# Patient Record
Sex: MALE | Race: WHITE | NOT HISPANIC OR LATINO | Employment: OTHER | URBAN - METROPOLITAN AREA
[De-identification: names, ages, dates, MRNs, and addresses within clinical notes are randomized per-mention and may not be internally consistent; named-entity substitution may affect disease eponyms.]

---

## 2023-04-14 PROBLEM — F41.9 ANXIETY DISORDER: Status: ACTIVE | Noted: 2023-04-14

## 2023-04-14 PROBLEM — N18.9 CKD (CHRONIC KIDNEY DISEASE): Status: ACTIVE | Noted: 2023-04-14

## 2023-04-14 PROBLEM — J01.10 ACUTE FRONTAL SINUSITIS: Status: ACTIVE | Noted: 2023-04-14

## 2023-04-14 PROBLEM — E78.5 HYPERLIPIDEMIA: Status: ACTIVE | Noted: 2023-04-14

## 2023-04-14 PROBLEM — N40.0 BPH (BENIGN PROSTATIC HYPERPLASIA): Status: ACTIVE | Noted: 2023-04-14

## 2023-04-14 PROBLEM — M25.519 SHOULDER PAIN: Status: ACTIVE | Noted: 2023-04-14

## 2023-04-14 PROBLEM — M25.552 LEFT HIP PAIN: Status: ACTIVE | Noted: 2023-04-14

## 2023-04-14 PROBLEM — E55.9 VITAMIN D DEFICIENCY: Status: ACTIVE | Noted: 2023-04-14

## 2023-04-14 PROBLEM — K57.32 DIVERTICULITIS, COLON: Status: ACTIVE | Noted: 2023-04-14

## 2023-04-14 PROBLEM — J30.2 SEASONAL ALLERGIES: Status: ACTIVE | Noted: 2023-04-14

## 2023-04-14 PROBLEM — J20.9 ACUTE BRONCHITIS: Status: ACTIVE | Noted: 2023-04-14

## 2023-04-14 PROBLEM — I48.91 ATRIAL FIBRILLATION (MULTI): Status: ACTIVE | Noted: 2023-04-14

## 2023-04-14 PROBLEM — R31.9 HEMATURIA: Status: ACTIVE | Noted: 2023-04-14

## 2023-04-14 PROBLEM — R51.9 HEADACHE: Status: ACTIVE | Noted: 2023-04-14

## 2023-04-14 PROBLEM — N20.0 RIGHT NEPHROLITHIASIS: Status: ACTIVE | Noted: 2023-04-14

## 2023-04-14 PROBLEM — H10.30 ACUTE CONJUNCTIVITIS: Status: ACTIVE | Noted: 2023-04-14

## 2023-04-14 PROBLEM — H92.09 EAR ACHE: Status: ACTIVE | Noted: 2023-04-14

## 2023-04-14 PROBLEM — J11.1 INFLUENZA: Status: ACTIVE | Noted: 2023-04-14

## 2023-04-14 PROBLEM — H02.009 ENTROPION: Status: ACTIVE | Noted: 2023-04-14

## 2023-04-14 PROBLEM — I10 HYPERTENSION: Status: ACTIVE | Noted: 2023-04-14

## 2023-04-14 PROBLEM — H93.8X1 SENSATION OF PLUGGED EAR ON RIGHT SIDE: Status: ACTIVE | Noted: 2023-04-14

## 2023-04-14 PROBLEM — J02.9 SORE THROAT: Status: ACTIVE | Noted: 2023-04-14

## 2023-04-14 PROBLEM — B35.1 ONYCHOMYCOSIS: Status: ACTIVE | Noted: 2023-04-14

## 2023-04-14 PROBLEM — N52.9 INABILITY TO ATTAIN ERECTION: Status: ACTIVE | Noted: 2023-04-14

## 2023-04-14 PROBLEM — H91.91 DECREASED HEARING OF RIGHT EAR: Status: ACTIVE | Noted: 2023-04-14

## 2023-04-14 PROBLEM — R10.9 ABDOMINAL PAIN: Status: ACTIVE | Noted: 2023-04-14

## 2023-04-14 PROBLEM — N52.9 ERECTILE DYSFUNCTION: Status: ACTIVE | Noted: 2023-04-14

## 2023-04-14 PROBLEM — T14.8XXA MUSCLE STRAIN: Status: ACTIVE | Noted: 2023-04-14

## 2023-04-14 PROBLEM — L03.90 CELLULITIS: Status: ACTIVE | Noted: 2023-04-14

## 2023-04-14 PROBLEM — R07.9 CHEST PAIN: Status: ACTIVE | Noted: 2023-04-14

## 2023-04-14 PROBLEM — M19.90 OSTEOARTHROSIS: Status: ACTIVE | Noted: 2023-04-14

## 2023-04-14 PROBLEM — H61.20 IMPACTED CERUMEN: Status: ACTIVE | Noted: 2023-04-14

## 2023-04-14 PROBLEM — I10 BENIGN ESSENTIAL HYPERTENSION: Status: ACTIVE | Noted: 2023-04-14

## 2023-04-14 PROBLEM — M54.50 LOW BACK PAIN: Status: ACTIVE | Noted: 2023-04-14

## 2023-04-14 PROBLEM — I25.10 CORONARY ATHEROSCLEROSIS OF NATIVE CORONARY VESSEL: Status: ACTIVE | Noted: 2023-04-14

## 2023-04-14 PROBLEM — R35.1 NOCTURIA: Status: ACTIVE | Noted: 2023-04-14

## 2023-04-14 PROBLEM — R42 DIZZINESS: Status: ACTIVE | Noted: 2023-04-14

## 2023-05-23 ENCOUNTER — APPOINTMENT (OUTPATIENT)
Dept: PRIMARY CARE | Facility: CLINIC | Age: 73
End: 2023-05-23
Payer: MEDICARE

## 2023-06-26 DIAGNOSIS — F41.9 ANXIETY DISORDER, UNSPECIFIED TYPE: ICD-10-CM

## 2023-06-26 DIAGNOSIS — N20.0 RIGHT NEPHROLITHIASIS: ICD-10-CM

## 2023-06-26 RX ORDER — BUSPIRONE HYDROCHLORIDE 30 MG/1
30 TABLET ORAL 2 TIMES DAILY
Qty: 180 TABLET | Refills: 3 | Status: SHIPPED | OUTPATIENT
Start: 2023-06-26 | End: 2024-04-09 | Stop reason: SDUPTHER

## 2023-06-26 RX ORDER — OMEPRAZOLE 40 MG/1
1 CAPSULE, DELAYED RELEASE ORAL DAILY
COMMUNITY
Start: 2021-06-07 | End: 2023-07-25 | Stop reason: SDUPTHER

## 2023-06-26 RX ORDER — TAMSULOSIN HYDROCHLORIDE 0.4 MG/1
0.4 CAPSULE ORAL 2 TIMES DAILY
COMMUNITY
Start: 2023-05-28 | End: 2023-06-26 | Stop reason: SDUPTHER

## 2023-06-26 RX ORDER — FISH OIL/DHA/EPA 1200-144MG
CAPSULE ORAL
COMMUNITY
Start: 2008-02-21

## 2023-06-26 RX ORDER — METOPROLOL SUCCINATE 200 MG/1
200 TABLET, EXTENDED RELEASE ORAL DAILY
COMMUNITY
End: 2023-07-25 | Stop reason: SDUPTHER

## 2023-06-26 RX ORDER — FUROSEMIDE 20 MG/1
20 TABLET ORAL
COMMUNITY
Start: 2023-06-08 | End: 2023-07-25 | Stop reason: SDUPTHER

## 2023-06-26 RX ORDER — FENOFIBRATE 160 MG/1
160 TABLET ORAL
COMMUNITY
Start: 2019-08-13 | End: 2023-07-25 | Stop reason: SDUPTHER

## 2023-06-26 RX ORDER — GABAPENTIN 300 MG/1
300 CAPSULE ORAL 3 TIMES DAILY
COMMUNITY
End: 2024-04-22 | Stop reason: SDUPTHER

## 2023-06-26 RX ORDER — GLUCOSAMINE/MSM/CHONDROIT SULF 500-166.6
TABLET ORAL
COMMUNITY
Start: 2021-11-18 | End: 2023-07-27 | Stop reason: ALTCHOICE

## 2023-06-26 RX ORDER — TADALAFIL 20 MG/1
TABLET ORAL
COMMUNITY
Start: 2013-07-26

## 2023-06-26 RX ORDER — ERGOCALCIFEROL 1.25 MG/1
1 CAPSULE ORAL WEEKLY
COMMUNITY
Start: 2018-04-19

## 2023-06-26 RX ORDER — BUSPIRONE HYDROCHLORIDE 30 MG/1
1 TABLET ORAL 2 TIMES DAILY
COMMUNITY
Start: 2019-08-20 | End: 2023-07-21 | Stop reason: SDUPTHER

## 2023-06-26 RX ORDER — TAMSULOSIN HYDROCHLORIDE 0.4 MG/1
0.4 CAPSULE ORAL 2 TIMES DAILY
Qty: 180 CAPSULE | Refills: 3 | Status: SHIPPED | OUTPATIENT
Start: 2023-06-26 | End: 2024-04-09 | Stop reason: SDUPTHER

## 2023-07-21 PROBLEM — H61.20 IMPACTED CERUMEN: Status: RESOLVED | Noted: 2023-04-14 | Resolved: 2023-07-21

## 2023-07-21 PROBLEM — J02.9 SORE THROAT: Status: RESOLVED | Noted: 2023-04-14 | Resolved: 2023-07-21

## 2023-07-21 PROBLEM — J20.9 ACUTE BRONCHITIS: Status: RESOLVED | Noted: 2023-04-14 | Resolved: 2023-07-21

## 2023-07-21 PROBLEM — J01.10 ACUTE FRONTAL SINUSITIS: Status: RESOLVED | Noted: 2023-04-14 | Resolved: 2023-07-21

## 2023-07-21 PROBLEM — R51.9 NEW ONSET OF HEADACHES: Status: ACTIVE | Noted: 2023-07-21

## 2023-07-21 PROBLEM — M25.522 ARTHRALGIA OF BOTH ELBOWS: Status: ACTIVE | Noted: 2023-07-21

## 2023-07-21 PROBLEM — E66.9 OBESITY (BMI 35.0-39.9 WITHOUT COMORBIDITY): Status: ACTIVE | Noted: 2020-07-20

## 2023-07-21 PROBLEM — R01.1 SYSTOLIC EJECTION MURMUR: Status: ACTIVE | Noted: 2017-06-11

## 2023-07-21 PROBLEM — L98.9 LESION OF SKIN OF NOSE: Status: ACTIVE | Noted: 2023-07-21

## 2023-07-21 PROBLEM — J34.2 DEVIATED NASAL SEPTUM: Status: ACTIVE | Noted: 2020-07-20

## 2023-07-21 PROBLEM — H90.A22 SENSORINEURAL HEARING LOSS, UNILATERAL, LEFT EAR, WITH RESTRICTED HEARING ON THE CONTRALATERAL SIDE: Status: ACTIVE | Noted: 2018-09-06

## 2023-07-21 PROBLEM — R51.9 HEADACHE: Status: RESOLVED | Noted: 2023-04-14 | Resolved: 2023-07-21

## 2023-07-21 PROBLEM — H10.30 ACUTE CONJUNCTIVITIS: Status: RESOLVED | Noted: 2023-04-14 | Resolved: 2023-07-21

## 2023-07-21 PROBLEM — M25.521 ARTHRALGIA OF BOTH ELBOWS: Status: ACTIVE | Noted: 2023-07-21

## 2023-07-21 PROBLEM — H92.09 EAR ACHE: Status: RESOLVED | Noted: 2023-04-14 | Resolved: 2023-07-21

## 2023-07-21 PROBLEM — I10 HYPERTENSION: Status: RESOLVED | Noted: 2023-04-14 | Resolved: 2023-07-21

## 2023-07-21 PROBLEM — J11.1 INFLUENZA: Status: RESOLVED | Noted: 2023-04-14 | Resolved: 2023-07-21

## 2023-07-21 PROBLEM — I87.2 VENOUS INSUFFICIENCY OF BOTH LOWER EXTREMITIES: Status: ACTIVE | Noted: 2023-07-21

## 2023-07-21 PROBLEM — N21.0 BLADDER STONE: Status: ACTIVE | Noted: 2023-07-21

## 2023-07-21 RX ORDER — FINASTERIDE 5 MG/1
TABLET, FILM COATED ORAL
COMMUNITY
Start: 2023-05-30 | End: 2024-04-09 | Stop reason: SDUPTHER

## 2023-07-21 RX ORDER — MELOXICAM 15 MG/1
TABLET ORAL
COMMUNITY
Start: 2023-01-16 | End: 2023-07-27 | Stop reason: ALTCHOICE

## 2023-07-25 ENCOUNTER — CLINICAL SUPPORT (OUTPATIENT)
Dept: PRIMARY CARE | Facility: CLINIC | Age: 73
End: 2023-07-25
Payer: MEDICARE

## 2023-07-25 DIAGNOSIS — E03.9 HYPOTHYROIDISM, UNSPECIFIED TYPE: ICD-10-CM

## 2023-07-25 DIAGNOSIS — Z12.5 SCREENING PSA (PROSTATE SPECIFIC ANTIGEN): ICD-10-CM

## 2023-07-25 DIAGNOSIS — Z00.00 ENCOUNTER FOR ANNUAL WELLNESS VISIT (AWV) IN MEDICARE PATIENT: ICD-10-CM

## 2023-07-25 DIAGNOSIS — D50.9 IRON DEFICIENCY ANEMIA, UNSPECIFIED IRON DEFICIENCY ANEMIA TYPE: ICD-10-CM

## 2023-07-25 DIAGNOSIS — N18.9 CHRONIC KIDNEY DISEASE, UNSPECIFIED CKD STAGE: ICD-10-CM

## 2023-07-25 DIAGNOSIS — E66.9 OBESITY (BMI 35.0-39.9 WITHOUT COMORBIDITY): ICD-10-CM

## 2023-07-25 DIAGNOSIS — E55.9 VITAMIN D DEFICIENCY: ICD-10-CM

## 2023-07-25 DIAGNOSIS — I10 BENIGN ESSENTIAL HYPERTENSION: ICD-10-CM

## 2023-07-25 DIAGNOSIS — E78.2 MIXED HYPERLIPIDEMIA: ICD-10-CM

## 2023-07-25 DIAGNOSIS — I10 PRIMARY HYPERTENSION: ICD-10-CM

## 2023-07-25 DIAGNOSIS — E78.00 ELEVATED LDL CHOLESTEROL LEVEL: ICD-10-CM

## 2023-07-25 DIAGNOSIS — R10.9 ABDOMINAL PAIN, UNSPECIFIED ABDOMINAL LOCATION: ICD-10-CM

## 2023-07-25 DIAGNOSIS — F41.9 ANXIETY DISORDER, UNSPECIFIED TYPE: ICD-10-CM

## 2023-07-25 LAB
ALANINE AMINOTRANSFERASE (SGPT) (U/L) IN SER/PLAS: 17 U/L (ref 10–52)
ALBUMIN (G/DL) IN SER/PLAS: 4.3 G/DL (ref 3.4–5)
ALKALINE PHOSPHATASE (U/L) IN SER/PLAS: 44 U/L (ref 33–136)
ANION GAP IN SER/PLAS: 13 MMOL/L (ref 10–20)
ASPARTATE AMINOTRANSFERASE (SGOT) (U/L) IN SER/PLAS: 19 U/L (ref 9–39)
BILIRUBIN TOTAL (MG/DL) IN SER/PLAS: 0.7 MG/DL (ref 0–1.2)
CALCIDIOL (25 OH VITAMIN D3) (NG/ML) IN SER/PLAS: 37 NG/ML
CALCIUM (MG/DL) IN SER/PLAS: 10.1 MG/DL (ref 8.6–10.6)
CARBON DIOXIDE, TOTAL (MMOL/L) IN SER/PLAS: 26 MMOL/L (ref 21–32)
CHLORIDE (MMOL/L) IN SER/PLAS: 104 MMOL/L (ref 98–107)
CHOLESTEROL (MG/DL) IN SER/PLAS: 168 MG/DL (ref 0–199)
CHOLESTEROL IN HDL (MG/DL) IN SER/PLAS: 38.8 MG/DL
CHOLESTEROL/HDL RATIO: 4.3
CREATININE (MG/DL) IN SER/PLAS: 1.29 MG/DL (ref 0.5–1.3)
ERYTHROCYTE DISTRIBUTION WIDTH (RATIO) BY AUTOMATED COUNT: 13.7 % (ref 11.5–14.5)
ERYTHROCYTE MEAN CORPUSCULAR HEMOGLOBIN CONCENTRATION (G/DL) BY AUTOMATED: 31.4 G/DL (ref 32–36)
ERYTHROCYTE MEAN CORPUSCULAR VOLUME (FL) BY AUTOMATED COUNT: 99 FL (ref 80–100)
ERYTHROCYTES (10*6/UL) IN BLOOD BY AUTOMATED COUNT: 4.75 X10E12/L (ref 4.5–5.9)
GFR MALE: 59 ML/MIN/1.73M2
GLUCOSE (MG/DL) IN SER/PLAS: 86 MG/DL (ref 74–99)
HEMATOCRIT (%) IN BLOOD BY AUTOMATED COUNT: 46.8 % (ref 41–52)
HEMOGLOBIN (G/DL) IN BLOOD: 14.7 G/DL (ref 13.5–17.5)
LDL: 90 MG/DL (ref 0–99)
LEUKOCYTES (10*3/UL) IN BLOOD BY AUTOMATED COUNT: 4.4 X10E9/L (ref 4.4–11.3)
NRBC (PER 100 WBCS) BY AUTOMATED COUNT: 0 /100 WBC (ref 0–0)
PLATELETS (10*3/UL) IN BLOOD AUTOMATED COUNT: 222 X10E9/L (ref 150–450)
POTASSIUM (MMOL/L) IN SER/PLAS: 4.3 MMOL/L (ref 3.5–5.3)
PROSTATE SPECIFIC ANTIGEN,SCREEN: 1.46 NG/ML (ref 0–4)
PROTEIN TOTAL: 6.6 G/DL (ref 6.4–8.2)
SODIUM (MMOL/L) IN SER/PLAS: 139 MMOL/L (ref 136–145)
THYROTROPIN (MIU/L) IN SER/PLAS BY DETECTION LIMIT <= 0.05 MIU/L: 1.72 MIU/L (ref 0.44–3.98)
TRIGLYCERIDE (MG/DL) IN SER/PLAS: 194 MG/DL (ref 0–149)
UREA NITROGEN (MG/DL) IN SER/PLAS: 16 MG/DL (ref 6–23)
VLDL: 39 MG/DL (ref 0–40)

## 2023-07-25 PROCEDURE — G0103 PSA SCREENING: HCPCS

## 2023-07-25 PROCEDURE — 85027 COMPLETE CBC AUTOMATED: CPT

## 2023-07-25 PROCEDURE — 80061 LIPID PANEL: CPT

## 2023-07-25 PROCEDURE — 82306 VITAMIN D 25 HYDROXY: CPT

## 2023-07-25 PROCEDURE — 80053 COMPREHEN METABOLIC PANEL: CPT

## 2023-07-25 PROCEDURE — 84443 ASSAY THYROID STIM HORMONE: CPT

## 2023-07-25 RX ORDER — FENOFIBRATE 160 MG/1
160 TABLET ORAL
Qty: 90 TABLET | Refills: 3 | Status: SHIPPED | OUTPATIENT
Start: 2023-07-25 | End: 2024-04-09 | Stop reason: SDUPTHER

## 2023-07-25 RX ORDER — OMEPRAZOLE 40 MG/1
40 CAPSULE, DELAYED RELEASE ORAL DAILY
Qty: 90 CAPSULE | Refills: 3 | Status: SHIPPED | OUTPATIENT
Start: 2023-07-25

## 2023-07-25 RX ORDER — FUROSEMIDE 20 MG/1
20 TABLET ORAL
Qty: 90 TABLET | Refills: 3 | Status: SHIPPED | OUTPATIENT
Start: 2023-07-25

## 2023-07-25 RX ORDER — METOPROLOL SUCCINATE 200 MG/1
200 TABLET, EXTENDED RELEASE ORAL DAILY
Qty: 90 TABLET | Refills: 3 | Status: SHIPPED | OUTPATIENT
Start: 2023-07-25 | End: 2024-04-09 | Stop reason: SDUPTHER

## 2023-07-27 ENCOUNTER — OFFICE VISIT (OUTPATIENT)
Dept: PRIMARY CARE | Facility: CLINIC | Age: 73
End: 2023-07-27
Payer: MEDICARE

## 2023-07-27 VITALS
RESPIRATION RATE: 12 BRPM | WEIGHT: 254 LBS | HEIGHT: 72 IN | HEART RATE: 74 BPM | DIASTOLIC BLOOD PRESSURE: 76 MMHG | SYSTOLIC BLOOD PRESSURE: 112 MMHG | OXYGEN SATURATION: 95 % | BODY MASS INDEX: 34.4 KG/M2

## 2023-07-27 DIAGNOSIS — E66.9 OBESITY (BMI 35.0-39.9 WITHOUT COMORBIDITY): ICD-10-CM

## 2023-07-27 DIAGNOSIS — N40.0 BENIGN PROSTATIC HYPERPLASIA WITHOUT LOWER URINARY TRACT SYMPTOMS: ICD-10-CM

## 2023-07-27 DIAGNOSIS — I25.10 ATHEROSCLEROSIS OF NATIVE CORONARY ARTERY OF NATIVE HEART WITHOUT ANGINA PECTORIS: ICD-10-CM

## 2023-07-27 DIAGNOSIS — I10 BENIGN ESSENTIAL HYPERTENSION: ICD-10-CM

## 2023-07-27 DIAGNOSIS — N18.31 STAGE 3A CHRONIC KIDNEY DISEASE (MULTI): ICD-10-CM

## 2023-07-27 DIAGNOSIS — Z00.00 ENCOUNTER FOR ANNUAL WELLNESS VISIT (AWV) IN MEDICARE PATIENT: ICD-10-CM

## 2023-07-27 DIAGNOSIS — I48.20 CHRONIC ATRIAL FIBRILLATION (MULTI): Primary | ICD-10-CM

## 2023-07-27 LAB
POC APPEARANCE, URINE: CLEAR
POC BILIRUBIN, URINE: NEGATIVE
POC BLOOD, URINE: NEGATIVE
POC COLOR, URINE: YELLOW
POC GLUCOSE, URINE: NEGATIVE MG/DL
POC KETONES, URINE: NEGATIVE MG/DL
POC LEUKOCYTES, URINE: NEGATIVE
POC NITRITE,URINE: NEGATIVE
POC OCCULT BLOOD STOOL #1: NEGATIVE
POC PH, URINE: 6 PH
POC PROTEIN, URINE: NEGATIVE MG/DL
POC SPECIFIC GRAVITY, URINE: 1.02
POC UROBILINOGEN, URINE: 0.2 EU/DL

## 2023-07-27 PROCEDURE — 3078F DIAST BP <80 MM HG: CPT | Performed by: INTERNAL MEDICINE

## 2023-07-27 PROCEDURE — 1036F TOBACCO NON-USER: CPT | Performed by: INTERNAL MEDICINE

## 2023-07-27 PROCEDURE — 82270 OCCULT BLOOD FECES: CPT | Performed by: INTERNAL MEDICINE

## 2023-07-27 PROCEDURE — 99214 OFFICE O/P EST MOD 30 MIN: CPT | Performed by: INTERNAL MEDICINE

## 2023-07-27 PROCEDURE — 3074F SYST BP LT 130 MM HG: CPT | Performed by: INTERNAL MEDICINE

## 2023-07-27 PROCEDURE — G0439 PPPS, SUBSEQ VISIT: HCPCS | Performed by: INTERNAL MEDICINE

## 2023-07-27 PROCEDURE — 1159F MED LIST DOCD IN RCRD: CPT | Performed by: INTERNAL MEDICINE

## 2023-07-27 PROCEDURE — 81002 URINALYSIS NONAUTO W/O SCOPE: CPT | Performed by: INTERNAL MEDICINE

## 2023-07-27 RX ORDER — METOPROLOL SUCCINATE 100 MG/1
100 TABLET, EXTENDED RELEASE ORAL DAILY
COMMUNITY

## 2023-07-27 ASSESSMENT — ENCOUNTER SYMPTOMS
DEPRESSION: 0
LOSS OF SENSATION IN FEET: 0
OCCASIONAL FEELINGS OF UNSTEADINESS: 0

## 2023-07-27 ASSESSMENT — PATIENT HEALTH QUESTIONNAIRE - PHQ9
2. FEELING DOWN, DEPRESSED OR HOPELESS: NOT AT ALL
1. LITTLE INTEREST OR PLEASURE IN DOING THINGS: NOT AT ALL
SUM OF ALL RESPONSES TO PHQ9 QUESTIONS 1 AND 2: 0

## 2023-07-27 NOTE — PROGRESS NOTES
Subjective :  Chief Complaint: Seth Antony is an 73 y.o. male here for an annual wellness visit and general medical care and f/u.     HPI:  Annual physical exam        Review of Systems  All systems reviewed and negative except for what was mentioned in the HPI    Objective   /76   Pulse 74   Resp 12   Ht 1.829 m (6')   Wt 115 kg (254 lb)   SpO2 95%   BMI 34.45 kg/m²     Physical Exam  Vitals reviewed.   Constitutional:       Appearance: Normal appearance.   HENT:      Head: Normocephalic and atraumatic.   Cardiovascular:      Rate and Rhythm: Normal rate and regular rhythm.   Pulmonary:      Effort: Pulmonary effort is normal.      Breath sounds: Normal breath sounds.   Abdominal:      General: Bowel sounds are normal.      Palpations: Abdomen is soft.   Musculoskeletal:      Cervical back: Neck supple.   Skin:     General: Skin is warm and dry.   Neurological:      General: No focal deficit present.      Mental Status: He is alert.   Psychiatric:         Mood and Affect: Mood normal.         Behavior: Behavior is cooperative.         Imaging:  No results found.     Labs reviewed:    Lab Results   Component Value Date    WBC 4.4 07/25/2023    HGB 14.7 07/25/2023    HCT 46.8 07/25/2023     07/25/2023    CHOL 168 07/25/2023    TRIG 194 (H) 07/25/2023    HDL 38.8 (A) 07/25/2023    ALT 17 07/25/2023    AST 19 07/25/2023     07/25/2023    K 4.3 07/25/2023     07/25/2023    CREATININE 1.29 07/25/2023    BUN 16 07/25/2023    CO2 26 07/25/2023    TSH 1.72 07/25/2023    PSA 2.04 06/17/2020    INR 0.9 11/01/2022       Past Medical, Surgical, and Family History reviewed and updated in chart.    I have reviewed and reconciled the medication list with the patient today.   Current Outpatient Medications:     busPIRone (Buspar) 30 mg tablet, Take 1 tablet (30 mg) by mouth 2 times a day., Disp: 180 tablet, Rfl: 3    ergocalciferol (Vitamin D-2) 1.25 MG (94064 UT) capsule, Take 1 capsule (1,250 mcg)  by mouth once a week., Disp: , Rfl:     fenofibrate (Triglide) 160 mg tablet, Take 1 tablet (160 mg) by mouth once daily., Disp: 90 tablet, Rfl: 3    finasteride (Proscar) 5 mg tablet, TAKE ONE TABLET BY MOUTH EVERY DAY, Disp: , Rfl:     fish oil-dha-epa 1,200-144-216 mg capsule, Take by mouth., Disp: , Rfl:     furosemide (Lasix) 20 mg tablet, Take 1 tablet (20 mg) by mouth once daily., Disp: 90 tablet, Rfl: 3    gabapentin (Neurontin) 300 mg capsule, Take 1 capsule (300 mg) by mouth 3 times a day., Disp: , Rfl:     metoprolol succinate XL (Toprol-XL) 100 mg 24 hr tablet, Take 1 tablet (100 mg) by mouth once daily. Do not crush or chew., Disp: , Rfl:     metoprolol succinate XL (Toprol-XL) 200 mg 24 hr tablet, Take 1 tablet (200 mg) by mouth once daily., Disp: 90 tablet, Rfl: 3    omeprazole (PriLOSEC) 40 mg DR capsule, Take 1 capsule (40 mg) by mouth once daily., Disp: 90 capsule, Rfl: 3    rosuvastatin (Crestor) 20 mg tablet, TAKE ONE TABLET BY MOUTH EVERY DAY, Disp: 90 tablet, Rfl: 0    tadalafil 20 mg tablet, Take by mouth., Disp: , Rfl:     tamsulosin (Flomax) 0.4 mg 24 hr capsule, Take 1 capsule (0.4 mg) by mouth 2 times a day., Disp: 180 capsule, Rfl: 3    multivitamin capsule, Take 1 capsule by mouth once daily., Disp: , Rfl:      List of current healthcare providers:  Patient Care Team:  Tone Shin MD as PCP - General     HRA:     Over the past 2 weeks, how often have you been bothered by any of the following problems?  Little interest or pleasure in doing things: Not at all  Feeling down, depressed, or hopeless: Not at all  Patient Health Questionnaire-2 Score: 0                         Assessment/Plan :  Problem List Items Addressed This Visit       Atrial fibrillation (CMS/HCC) - Primary    Relevant Medications    metoprolol succinate XL (Toprol-XL) 100 mg 24 hr tablet    Benign essential hypertension    BPH (benign prostatic hyperplasia)    CKD (chronic kidney disease)    Coronary atherosclerosis  of native coronary vessel    Relevant Medications    metoprolol succinate XL (Toprol-XL) 100 mg 24 hr tablet    Obesity (BMI 35.0-39.9 without comorbidity)     Other Visit Diagnoses       Encounter for annual wellness visit (AWV) in Medicare patient        Relevant Orders    POCT UA (nonautomated) manually resulted (Completed)    POCT occult blood stool manually resulted (Completed)        Hypertension metoprolol is increased  BPH continue Flomax  Vitamin D continue vitamin D  Hyperlipidemia continue fenofibrate add fish oil  Insomnia melatonin  Edema continue Lasix  Anxiety BuSpar  Neuropathy continue gabapentin for the pain  GERD continue omeprazole  Follow-up with me in 4 months to check a lipid CMP CBC  The following health maintenance schedule was reviewed with the patient and provided in printed form in the after visit summary:  Health Maintenance   Topic Date Due    Influenza Vaccine (1) 09/01/2023    TSH Level  07/25/2024    Medicare Annual Wellness Visit (AWV)  07/28/2024    Colorectal Cancer Screening  07/21/2025    DTaP/Tdap/Td Vaccines (2 - Td or Tdap) 06/27/2026    Lipid Panel  07/25/2028    HIB Vaccines  Aged Out    Hepatitis B Vaccines  Aged Out    IPV Vaccines  Aged Out    Hepatitis A Vaccines  Aged Out    Meningococcal Vaccine  Aged Out    Rotavirus Vaccines  Aged Out    HPV Vaccines  Aged Out    Pneumococcal Vaccine: 65+ Years  Discontinued    Zoster Vaccines  Discontinued    Hepatitis C Screening  Discontinued    Diabetes Screening  Discontinued    COVID-19 Vaccine  Discontinued    Irritable Bowel Syndrome  Discontinued       Advance Care Planning                Orders Placed This Encounter   Procedures    POCT UA (nonautomated) manually resulted    POCT occult blood stool manually resulted       Continue current medications as listed  Follow up in 4 months check a CMP lipid

## 2023-12-01 ENCOUNTER — TELEPHONE (OUTPATIENT)
Dept: PRIMARY CARE | Facility: CLINIC | Age: 73
End: 2023-12-01
Payer: MEDICARE

## 2023-12-01 DIAGNOSIS — R31.9 HEMATURIA: Primary | ICD-10-CM

## 2023-12-01 DIAGNOSIS — I10 BENIGN ESSENTIAL HYPERTENSION: ICD-10-CM

## 2023-12-01 DIAGNOSIS — I25.10 CORONARY ATHEROSCLEROSIS OF NATIVE CORONARY VESSEL: ICD-10-CM

## 2023-12-04 ENCOUNTER — LAB (OUTPATIENT)
Dept: LAB | Facility: LAB | Age: 73
End: 2023-12-04
Payer: MEDICARE

## 2023-12-04 DIAGNOSIS — R31.9 HEMATURIA: ICD-10-CM

## 2023-12-04 DIAGNOSIS — I25.10 CORONARY ATHEROSCLEROSIS OF NATIVE CORONARY VESSEL: ICD-10-CM

## 2023-12-04 DIAGNOSIS — I10 BENIGN ESSENTIAL HYPERTENSION: ICD-10-CM

## 2023-12-04 LAB
ALBUMIN SERPL BCP-MCNC: 4.3 G/DL (ref 3.4–5)
ALP SERPL-CCNC: 44 U/L (ref 33–136)
ALT SERPL W P-5'-P-CCNC: 16 U/L (ref 10–52)
ANION GAP SERPL CALC-SCNC: 13 MMOL/L (ref 10–20)
AST SERPL W P-5'-P-CCNC: 17 U/L (ref 9–39)
BILIRUB SERPL-MCNC: 0.7 MG/DL (ref 0–1.2)
BUN SERPL-MCNC: 13 MG/DL (ref 6–23)
CALCIUM SERPL-MCNC: 9.7 MG/DL (ref 8.6–10.6)
CHLORIDE SERPL-SCNC: 102 MMOL/L (ref 98–107)
CHOLEST SERPL-MCNC: 159 MG/DL (ref 0–199)
CHOLESTEROL/HDL RATIO: 4.1
CO2 SERPL-SCNC: 29 MMOL/L (ref 21–32)
CREAT SERPL-MCNC: 1.13 MG/DL (ref 0.5–1.3)
ERYTHROCYTE [DISTWIDTH] IN BLOOD BY AUTOMATED COUNT: 12.8 % (ref 11.5–14.5)
GFR SERPL CREATININE-BSD FRML MDRD: 69 ML/MIN/1.73M*2
GLUCOSE SERPL-MCNC: 87 MG/DL (ref 74–99)
HCT VFR BLD AUTO: 45.2 % (ref 41–52)
HDLC SERPL-MCNC: 38.7 MG/DL
HGB BLD-MCNC: 14.9 G/DL (ref 13.5–17.5)
LDLC SERPL CALC-MCNC: 85 MG/DL
MCH RBC QN AUTO: 31.8 PG (ref 26–34)
MCHC RBC AUTO-ENTMCNC: 33 G/DL (ref 32–36)
MCV RBC AUTO: 96 FL (ref 80–100)
NON HDL CHOLESTEROL: 120 MG/DL (ref 0–149)
NRBC BLD-RTO: 0 /100 WBCS (ref 0–0)
PLATELET # BLD AUTO: 212 X10*3/UL (ref 150–450)
POTASSIUM SERPL-SCNC: 4.2 MMOL/L (ref 3.5–5.3)
PROT SERPL-MCNC: 6.6 G/DL (ref 6.4–8.2)
RBC # BLD AUTO: 4.69 X10*6/UL (ref 4.5–5.9)
SODIUM SERPL-SCNC: 140 MMOL/L (ref 136–145)
TRIGL SERPL-MCNC: 176 MG/DL (ref 0–149)
VLDL: 35 MG/DL (ref 0–40)
WBC # BLD AUTO: 4.1 X10*3/UL (ref 4.4–11.3)

## 2023-12-04 PROCEDURE — 80053 COMPREHEN METABOLIC PANEL: CPT

## 2023-12-04 PROCEDURE — 80061 LIPID PANEL: CPT

## 2023-12-04 PROCEDURE — 85027 COMPLETE CBC AUTOMATED: CPT

## 2023-12-04 PROCEDURE — 36415 COLL VENOUS BLD VENIPUNCTURE: CPT

## 2023-12-07 ENCOUNTER — OFFICE VISIT (OUTPATIENT)
Dept: PRIMARY CARE | Facility: CLINIC | Age: 73
End: 2023-12-07
Payer: MEDICARE

## 2023-12-07 VITALS
BODY MASS INDEX: 34.45 KG/M2 | WEIGHT: 254 LBS | HEART RATE: 68 BPM | DIASTOLIC BLOOD PRESSURE: 78 MMHG | OXYGEN SATURATION: 93 % | SYSTOLIC BLOOD PRESSURE: 120 MMHG | RESPIRATION RATE: 12 BRPM

## 2023-12-07 DIAGNOSIS — E78.2 MIXED HYPERLIPIDEMIA: ICD-10-CM

## 2023-12-07 DIAGNOSIS — N18.2 STAGE 2 CHRONIC KIDNEY DISEASE: ICD-10-CM

## 2023-12-07 DIAGNOSIS — G47.33 OSA (OBSTRUCTIVE SLEEP APNEA): ICD-10-CM

## 2023-12-07 DIAGNOSIS — F06.4 ANXIETY DISORDER DUE TO KNOWN PHYSIOLOGICAL CONDITION: Primary | ICD-10-CM

## 2023-12-07 DIAGNOSIS — I48.91 ATRIAL FIBRILLATION, UNSPECIFIED TYPE (MULTI): ICD-10-CM

## 2023-12-07 DIAGNOSIS — I48.20 CHRONIC ATRIAL FIBRILLATION (MULTI): ICD-10-CM

## 2023-12-07 DIAGNOSIS — E55.9 VITAMIN D DEFICIENCY: ICD-10-CM

## 2023-12-07 DIAGNOSIS — E66.9 OBESITY (BMI 35.0-39.9 WITHOUT COMORBIDITY): ICD-10-CM

## 2023-12-07 DIAGNOSIS — N18.9 CHRONIC KIDNEY DISEASE, UNSPECIFIED CKD STAGE: ICD-10-CM

## 2023-12-07 DIAGNOSIS — I10 BENIGN ESSENTIAL HYPERTENSION: ICD-10-CM

## 2023-12-07 PROCEDURE — 1036F TOBACCO NON-USER: CPT | Performed by: INTERNAL MEDICINE

## 2023-12-07 PROCEDURE — 99214 OFFICE O/P EST MOD 30 MIN: CPT | Performed by: INTERNAL MEDICINE

## 2023-12-07 PROCEDURE — 1159F MED LIST DOCD IN RCRD: CPT | Performed by: INTERNAL MEDICINE

## 2023-12-07 PROCEDURE — 3078F DIAST BP <80 MM HG: CPT | Performed by: INTERNAL MEDICINE

## 2023-12-07 PROCEDURE — 3074F SYST BP LT 130 MM HG: CPT | Performed by: INTERNAL MEDICINE

## 2023-12-07 NOTE — PROGRESS NOTES
Subjective   Chief complaint: Seth Antony is a 73 y.o. male who presents for Follow-up (Pt is here for blood work follow up , pt c/o feeling something under the left armpit ).    HPI:  Follow-up general medical care        Objective   /78   Pulse 68   Resp 12   Wt 115 kg (254 lb)   SpO2 93%   BMI 34.45 kg/m²   Physical Exam  Vitals reviewed.   Constitutional:       Appearance: Normal appearance.   HENT:      Head: Normocephalic and atraumatic.   Cardiovascular:      Rate and Rhythm: Normal rate and regular rhythm.   Pulmonary:      Effort: Pulmonary effort is normal.      Breath sounds: Normal breath sounds.   Abdominal:      General: Bowel sounds are normal.      Palpations: Abdomen is soft.   Musculoskeletal:      Cervical back: Neck supple.   Skin:     General: Skin is warm and dry.   Neurological:      General: No focal deficit present.      Mental Status: He is alert.   Psychiatric:         Mood and Affect: Mood normal.         Behavior: Behavior is cooperative.         I have reviewed and reconciled the medication list with the patient today.   Current Outpatient Medications:     busPIRone (Buspar) 30 mg tablet, Take 1 tablet (30 mg) by mouth 2 times a day., Disp: 180 tablet, Rfl: 3    ergocalciferol (Vitamin D-2) 1.25 MG (52062 UT) capsule, Take 1 capsule (1,250 mcg) by mouth once a week., Disp: , Rfl:     fenofibrate (Triglide) 160 mg tablet, Take 1 tablet (160 mg) by mouth once daily., Disp: 90 tablet, Rfl: 3    finasteride (Proscar) 5 mg tablet, TAKE ONE TABLET BY MOUTH EVERY DAY, Disp: , Rfl:     fish oil-dha-epa 1,200-144-216 mg capsule, Take by mouth., Disp: , Rfl:     furosemide (Lasix) 20 mg tablet, Take 1 tablet (20 mg) by mouth once daily., Disp: 90 tablet, Rfl: 3    gabapentin (Neurontin) 300 mg capsule, Take 1 capsule (300 mg) by mouth 3 times a day., Disp: , Rfl:     metoprolol succinate XL (Toprol-XL) 100 mg 24 hr tablet, Take 1 tablet (100 mg) by mouth once daily. Do not crush or  chew., Disp: , Rfl:     metoprolol succinate XL (Toprol-XL) 200 mg 24 hr tablet, Take 1 tablet (200 mg) by mouth once daily., Disp: 90 tablet, Rfl: 3    multivitamin capsule, Take 1 capsule by mouth once daily., Disp: , Rfl:     omeprazole (PriLOSEC) 40 mg DR capsule, Take 1 capsule (40 mg) by mouth once daily., Disp: 90 capsule, Rfl: 3    rosuvastatin (Crestor) 20 mg tablet, TAKE ONE TABLET BY MOUTH EVERY DAY, Disp: 90 tablet, Rfl: 0    tadalafil 20 mg tablet, Take by mouth., Disp: , Rfl:     tamsulosin (Flomax) 0.4 mg 24 hr capsule, Take 1 capsule (0.4 mg) by mouth 2 times a day., Disp: 180 capsule, Rfl: 3     Imaging:  No results found.     Labs reviewed:    Lab Results   Component Value Date    WBC 4.1 (L) 12/04/2023    HGB 14.9 12/04/2023    HCT 45.2 12/04/2023     12/04/2023    CHOL 159 12/04/2023    TRIG 176 (H) 12/04/2023    HDL 38.7 12/04/2023    ALT 16 12/04/2023    AST 17 12/04/2023     12/04/2023    K 4.2 12/04/2023     12/04/2023    CREATININE 1.13 12/04/2023    BUN 13 12/04/2023    CO2 29 12/04/2023    TSH 1.72 07/25/2023    PSA 2.04 06/17/2020    INR 0.9 11/01/2022       Assessment/Plan   Problem List Items Addressed This Visit       Anxiety disorder - Primary     Continue home med         Atrial fibrillation (CMS/[facility])     Follow-up cardiology         CKD (chronic kidney disease)    Mixed hyperlipidemia     Fenofibrate         Vitamin D deficiency     Vitamin D supplement         Obesity (BMI 35.0-39.9 without comorbidity)     Follow low-fat diet         ROSIE (obstructive sleep apnea)     cPAP machine            Continue current medications as listed  Follow up in

## 2024-01-15 ENCOUNTER — TELEMEDICINE (OUTPATIENT)
Dept: PRIMARY CARE | Facility: CLINIC | Age: 74
End: 2024-01-15
Payer: MEDICARE

## 2024-01-15 DIAGNOSIS — U07.1 COVID-19: ICD-10-CM

## 2024-01-15 DIAGNOSIS — J01.00 ACUTE MAXILLARY SINUSITIS, RECURRENCE NOT SPECIFIED: ICD-10-CM

## 2024-01-15 DIAGNOSIS — U07.1 COVID: Primary | ICD-10-CM

## 2024-01-15 PROCEDURE — 99214 OFFICE O/P EST MOD 30 MIN: CPT | Performed by: INTERNAL MEDICINE

## 2024-01-15 RX ORDER — AZITHROMYCIN 250 MG/1
TABLET, FILM COATED ORAL
Qty: 6 TABLET | Refills: 0 | Status: SHIPPED | OUTPATIENT
Start: 2024-01-15 | End: 2024-01-20

## 2024-01-15 RX ORDER — GUAIFENESIN 600 MG/1
1200 TABLET, EXTENDED RELEASE ORAL 2 TIMES DAILY
Qty: 120 TABLET | Refills: 1 | Status: SHIPPED | OUTPATIENT
Start: 2024-01-15 | End: 2024-04-09 | Stop reason: ALTCHOICE

## 2024-01-15 RX ORDER — FLUTICASONE PROPIONATE 50 MCG
1 SPRAY, SUSPENSION (ML) NASAL DAILY
Qty: 16 G | Refills: 11 | Status: SHIPPED | OUTPATIENT
Start: 2024-01-15 | End: 2025-01-14

## 2024-01-15 NOTE — PROGRESS NOTES
Subjective   Chief complaint: Seth Antony is a 73 y.o. male who presents for Covid-19 Home Monitoring Visit (Pt tested positive for covid, pt c/o started Tuesday with sore throat, fever, cough runny nose).    HPI:  This is a virtual visit for 73 years old white male with a history of atherosclerotic heart disease A-fib hypertension hyperlipidemia who presented with the symptoms started Tuesday with fever chills body ache cough short of breath the symptoms been there for 5 days now he has a cough productive yellow sputum but the symptoms are disappearing he still have nausea dizziness and lightheaded.  Patient past the window to give the Paxlovid is over 5 days.  Patient sound stuffy and congested and flushed in his sinuses area.        Objective   There were no vitals taken for this visit.  Physical Exam  Constitutional:       Appearance: Normal appearance.   HENT:      Head: Normocephalic and atraumatic.      Comments: Patient sounds stuffy in the nose with congested sinuses and tender face COVID-19 COVID-19 COVID-19 infection  Pulmonary:      Effort: Pulmonary effort is normal.   Abdominal:      General: Bowel sounds are normal.      Palpations: Abdomen is soft.   Musculoskeletal:      Cervical back: Neck supple.   Skin:     General: Skin is warm and dry.   Neurological:      General: No focal deficit present.      Mental Status: He is alert.   Psychiatric:         Mood and Affect: Mood normal.         Behavior: Behavior is cooperative.         I have reviewed and reconciled the medication list with the patient today.   Current Outpatient Medications:     busPIRone (Buspar) 30 mg tablet, Take 1 tablet (30 mg) by mouth 2 times a day., Disp: 180 tablet, Rfl: 3    ergocalciferol (Vitamin D-2) 1.25 MG (86118 UT) capsule, Take 1 capsule (1,250 mcg) by mouth once a week., Disp: , Rfl:     fenofibrate (Triglide) 160 mg tablet, Take 1 tablet (160 mg) by mouth once daily., Disp: 90 tablet, Rfl: 3    finasteride (Proscar)  5 mg tablet, TAKE ONE TABLET BY MOUTH EVERY DAY, Disp: , Rfl:     fish oil-dha-epa 1,200-144-216 mg capsule, Take by mouth., Disp: , Rfl:     furosemide (Lasix) 20 mg tablet, Take 1 tablet (20 mg) by mouth once daily., Disp: 90 tablet, Rfl: 3    gabapentin (Neurontin) 300 mg capsule, Take 1 capsule (300 mg) by mouth 3 times a day., Disp: , Rfl:     metoprolol succinate XL (Toprol-XL) 100 mg 24 hr tablet, Take 1 tablet (100 mg) by mouth once daily. Do not crush or chew., Disp: , Rfl:     metoprolol succinate XL (Toprol-XL) 200 mg 24 hr tablet, Take 1 tablet (200 mg) by mouth once daily., Disp: 90 tablet, Rfl: 3    multivitamin capsule, Take 1 capsule by mouth once daily., Disp: , Rfl:     omeprazole (PriLOSEC) 40 mg DR capsule, Take 1 capsule (40 mg) by mouth once daily., Disp: 90 capsule, Rfl: 3    rosuvastatin (Crestor) 20 mg tablet, TAKE ONE TABLET BY MOUTH EVERY DAY, Disp: 90 tablet, Rfl: 0    tadalafil 20 mg tablet, Take by mouth., Disp: , Rfl:     tamsulosin (Flomax) 0.4 mg 24 hr capsule, Take 1 capsule (0.4 mg) by mouth 2 times a day., Disp: 180 capsule, Rfl: 3     Imaging:  No results found.     Labs reviewed:    Lab Results   Component Value Date    WBC 4.1 (L) 12/04/2023    HGB 14.9 12/04/2023    HCT 45.2 12/04/2023     12/04/2023    CHOL 159 12/04/2023    TRIG 176 (H) 12/04/2023    HDL 38.7 12/04/2023    ALT 16 12/04/2023    AST 17 12/04/2023     12/04/2023    K 4.2 12/04/2023     12/04/2023    CREATININE 1.13 12/04/2023    BUN 13 12/04/2023    CO2 29 12/04/2023    TSH 1.72 07/25/2023    PSA 2.04 06/17/2020    INR 0.9 11/01/2022       Assessment/Plan   Problem List Items Addressed This Visit       Acute maxillary sinusitis     Z-Graham  Mucinex  Flonase         COVID - Primary     Symptomatic treatment            Continue current medications as listed  Follow up in 1 week if not better

## 2024-04-02 ENCOUNTER — LAB (OUTPATIENT)
Dept: LAB | Facility: LAB | Age: 74
End: 2024-04-02
Payer: MEDICARE

## 2024-04-02 DIAGNOSIS — E78.2 MIXED HYPERLIPIDEMIA: ICD-10-CM

## 2024-04-02 DIAGNOSIS — I48.91 ATRIAL FIBRILLATION, UNSPECIFIED TYPE (MULTI): ICD-10-CM

## 2024-04-02 DIAGNOSIS — I10 BENIGN ESSENTIAL HYPERTENSION: ICD-10-CM

## 2024-04-02 DIAGNOSIS — N18.9 CHRONIC KIDNEY DISEASE, UNSPECIFIED CKD STAGE: ICD-10-CM

## 2024-04-02 LAB
ERYTHROCYTE [DISTWIDTH] IN BLOOD BY AUTOMATED COUNT: 12.7 % (ref 11.5–14.5)
HCT VFR BLD AUTO: 43.2 % (ref 41–52)
HGB BLD-MCNC: 14.7 G/DL (ref 13.5–17.5)
MCH RBC QN AUTO: 31.7 PG (ref 26–34)
MCHC RBC AUTO-ENTMCNC: 34 G/DL (ref 32–36)
MCV RBC AUTO: 93 FL (ref 80–100)
NRBC BLD-RTO: 0 /100 WBCS (ref 0–0)
PLATELET # BLD AUTO: 207 X10*3/UL (ref 150–450)
RBC # BLD AUTO: 4.64 X10*6/UL (ref 4.5–5.9)
WBC # BLD AUTO: 3.3 X10*3/UL (ref 4.4–11.3)

## 2024-04-02 PROCEDURE — 80061 LIPID PANEL: CPT

## 2024-04-02 PROCEDURE — 36415 COLL VENOUS BLD VENIPUNCTURE: CPT

## 2024-04-02 PROCEDURE — 80053 COMPREHEN METABOLIC PANEL: CPT

## 2024-04-02 PROCEDURE — 85027 COMPLETE CBC AUTOMATED: CPT

## 2024-04-03 LAB
ALBUMIN SERPL BCP-MCNC: 4 G/DL (ref 3.4–5)
ALP SERPL-CCNC: 43 U/L (ref 33–136)
ALT SERPL W P-5'-P-CCNC: 16 U/L (ref 10–52)
ANION GAP SERPL CALC-SCNC: 10 MMOL/L (ref 10–20)
AST SERPL W P-5'-P-CCNC: 19 U/L (ref 9–39)
BILIRUB SERPL-MCNC: 0.6 MG/DL (ref 0–1.2)
BUN SERPL-MCNC: 11 MG/DL (ref 6–23)
CALCIUM SERPL-MCNC: 9.7 MG/DL (ref 8.6–10.6)
CHLORIDE SERPL-SCNC: 105 MMOL/L (ref 98–107)
CHOLEST SERPL-MCNC: 159 MG/DL (ref 0–199)
CHOLESTEROL/HDL RATIO: 4.4
CO2 SERPL-SCNC: 30 MMOL/L (ref 21–32)
CREAT SERPL-MCNC: 1.09 MG/DL (ref 0.5–1.3)
EGFRCR SERPLBLD CKD-EPI 2021: 72 ML/MIN/1.73M*2
GLUCOSE SERPL-MCNC: 101 MG/DL (ref 74–99)
HDLC SERPL-MCNC: 35.9 MG/DL
LDLC SERPL CALC-MCNC: 89 MG/DL
NON HDL CHOLESTEROL: 123 MG/DL (ref 0–149)
POTASSIUM SERPL-SCNC: 4.4 MMOL/L (ref 3.5–5.3)
PROT SERPL-MCNC: 6.3 G/DL (ref 6.4–8.2)
SODIUM SERPL-SCNC: 141 MMOL/L (ref 136–145)
TRIGL SERPL-MCNC: 170 MG/DL (ref 0–149)
VLDL: 34 MG/DL (ref 0–40)

## 2024-04-08 ENCOUNTER — APPOINTMENT (OUTPATIENT)
Dept: PRIMARY CARE | Facility: CLINIC | Age: 74
End: 2024-04-08
Payer: MEDICARE

## 2024-04-09 ENCOUNTER — TELEPHONE (OUTPATIENT)
Dept: PRIMARY CARE | Facility: CLINIC | Age: 74
End: 2024-04-09

## 2024-04-09 ENCOUNTER — OFFICE VISIT (OUTPATIENT)
Dept: PRIMARY CARE | Facility: CLINIC | Age: 74
End: 2024-04-09
Payer: MEDICARE

## 2024-04-09 VITALS
BODY MASS INDEX: 33.63 KG/M2 | SYSTOLIC BLOOD PRESSURE: 111 MMHG | WEIGHT: 248 LBS | HEART RATE: 70 BPM | OXYGEN SATURATION: 93 % | RESPIRATION RATE: 12 BRPM | DIASTOLIC BLOOD PRESSURE: 74 MMHG

## 2024-04-09 DIAGNOSIS — E78.2 MIXED HYPERLIPIDEMIA: ICD-10-CM

## 2024-04-09 DIAGNOSIS — Z00.00 ANNUAL PHYSICAL EXAM: ICD-10-CM

## 2024-04-09 DIAGNOSIS — E78.5 HYPERLIPIDEMIA, UNSPECIFIED HYPERLIPIDEMIA TYPE: ICD-10-CM

## 2024-04-09 DIAGNOSIS — N40.0 BENIGN PROSTATIC HYPERPLASIA, UNSPECIFIED WHETHER LOWER URINARY TRACT SYMPTOMS PRESENT: ICD-10-CM

## 2024-04-09 DIAGNOSIS — I10 BENIGN ESSENTIAL HYPERTENSION: ICD-10-CM

## 2024-04-09 DIAGNOSIS — F41.9 ANXIETY DISORDER, UNSPECIFIED TYPE: ICD-10-CM

## 2024-04-09 DIAGNOSIS — E55.9 VITAMIN D DEFICIENCY: Primary | ICD-10-CM

## 2024-04-09 DIAGNOSIS — N20.0 RIGHT NEPHROLITHIASIS: ICD-10-CM

## 2024-04-09 PROBLEM — H91.90 DECREASED HEARING: Status: ACTIVE | Noted: 2024-04-09

## 2024-04-09 PROCEDURE — 99214 OFFICE O/P EST MOD 30 MIN: CPT | Performed by: INTERNAL MEDICINE

## 2024-04-09 PROCEDURE — 3078F DIAST BP <80 MM HG: CPT | Performed by: INTERNAL MEDICINE

## 2024-04-09 PROCEDURE — 1159F MED LIST DOCD IN RCRD: CPT | Performed by: INTERNAL MEDICINE

## 2024-04-09 PROCEDURE — 3074F SYST BP LT 130 MM HG: CPT | Performed by: INTERNAL MEDICINE

## 2024-04-09 PROCEDURE — 1036F TOBACCO NON-USER: CPT | Performed by: INTERNAL MEDICINE

## 2024-04-09 RX ORDER — BUSPIRONE HYDROCHLORIDE 30 MG/1
30 TABLET ORAL 2 TIMES DAILY
Qty: 180 TABLET | Refills: 3 | Status: SHIPPED | OUTPATIENT
Start: 2024-04-09

## 2024-04-09 RX ORDER — FINASTERIDE 5 MG/1
5 TABLET, FILM COATED ORAL DAILY
Qty: 90 TABLET | Refills: 3 | Status: SHIPPED | OUTPATIENT
Start: 2024-04-09

## 2024-04-09 RX ORDER — TAMSULOSIN HYDROCHLORIDE 0.4 MG/1
0.4 CAPSULE ORAL 2 TIMES DAILY
Qty: 180 CAPSULE | Refills: 3 | Status: SHIPPED | OUTPATIENT
Start: 2024-04-09

## 2024-04-09 RX ORDER — METOPROLOL SUCCINATE 200 MG/1
200 TABLET, EXTENDED RELEASE ORAL DAILY
Qty: 90 TABLET | Refills: 3 | Status: SHIPPED | OUTPATIENT
Start: 2024-04-09

## 2024-04-09 RX ORDER — FENOFIBRATE 160 MG/1
160 TABLET ORAL
Qty: 90 TABLET | Refills: 3 | Status: SHIPPED | OUTPATIENT
Start: 2024-04-09

## 2024-04-09 RX ORDER — ROSUVASTATIN CALCIUM 20 MG/1
20 TABLET, COATED ORAL DAILY
Qty: 90 TABLET | Refills: 3 | Status: SHIPPED | OUTPATIENT
Start: 2024-04-09

## 2024-04-09 NOTE — PROGRESS NOTES
Subjective   Chief complaint: Seth Antony is a 74 y.o. male who presents for Follow-up (Pt being seen to follow up on blood work.).    HPI:  74 y.o. male with a history of HLD, HTN, anxiety. Pt has felt well lately. Recently got over COVID earlier this year. No complaints in office.         Objective   /74   Pulse 70   Resp 12   Wt 112 kg (248 lb)   SpO2 93%   BMI 33.63 kg/m²   Physical Exam  Constitutional:       Appearance: Normal appearance.   HENT:      Head: Normocephalic and atraumatic.      Mouth/Throat:      Mouth: Mucous membranes are moist.      Pharynx: Oropharynx is clear.   Eyes:      Pupils: Pupils are equal, round, and reactive to light.   Cardiovascular:      Rate and Rhythm: Normal rate and regular rhythm.      Heart sounds: Normal heart sounds.   Pulmonary:      Effort: Pulmonary effort is normal. No respiratory distress.      Breath sounds: Normal breath sounds.   Abdominal:      General: Abdomen is flat. There is no distension.   Musculoskeletal:         General: Normal range of motion.      Cervical back: Normal range of motion.   Skin:     General: Skin is warm and dry.   Neurological:      General: No focal deficit present.      Mental Status: He is alert.         I have reviewed and reconciled the medication list with the patient today.   Current Outpatient Medications:     busPIRone (Buspar) 30 mg tablet, Take 1 tablet (30 mg) by mouth 2 times a day., Disp: 180 tablet, Rfl: 3    ergocalciferol (Vitamin D-2) 1.25 MG (50332 UT) capsule, Take 1 capsule (1,250 mcg) by mouth once a week., Disp: , Rfl:     fenofibrate (Triglide) 160 mg tablet, Take 1 tablet (160 mg) by mouth once daily., Disp: 90 tablet, Rfl: 3    finasteride (Proscar) 5 mg tablet, TAKE ONE TABLET BY MOUTH EVERY DAY, Disp: , Rfl:     fish oil-dha-epa 1,200-144-216 mg capsule, Take by mouth., Disp: , Rfl:     fluticasone (Flonase) 50 mcg/actuation nasal spray, Administer 1 spray into each nostril once daily. Shake  gently. Before first use, prime pump. After use, clean tip and replace cap., Disp: 16 g, Rfl: 11    furosemide (Lasix) 20 mg tablet, Take 1 tablet (20 mg) by mouth once daily., Disp: 90 tablet, Rfl: 3    gabapentin (Neurontin) 300 mg capsule, Take 1 capsule (300 mg) by mouth 3 times a day., Disp: , Rfl:     metoprolol succinate XL (Toprol-XL) 100 mg 24 hr tablet, Take 1 tablet (100 mg) by mouth once daily. Do not crush or chew., Disp: , Rfl:     metoprolol succinate XL (Toprol-XL) 200 mg 24 hr tablet, Take 1 tablet (200 mg) by mouth once daily., Disp: 90 tablet, Rfl: 3    multivitamin capsule, Take 1 capsule by mouth once daily., Disp: , Rfl:     omeprazole (PriLOSEC) 40 mg DR capsule, Take 1 capsule (40 mg) by mouth once daily., Disp: 90 capsule, Rfl: 3    rosuvastatin (Crestor) 20 mg tablet, TAKE ONE TABLET BY MOUTH EVERY DAY, Disp: 90 tablet, Rfl: 0    tadalafil 20 mg tablet, Take by mouth., Disp: , Rfl:     tamsulosin (Flomax) 0.4 mg 24 hr capsule, Take 1 capsule (0.4 mg) by mouth 2 times a day., Disp: 180 capsule, Rfl: 3     Imaging:  No results found.     Labs reviewed:    Lab Results   Component Value Date    WBC 3.3 (L) 04/02/2024    HGB 14.7 04/02/2024    HCT 43.2 04/02/2024     04/02/2024    CHOL 159 04/02/2024    TRIG 170 (H) 04/02/2024    HDL 35.9 04/02/2024    ALT 16 04/02/2024    AST 19 04/02/2024     04/02/2024    K 4.4 04/02/2024     04/02/2024    CREATININE 1.09 04/02/2024    BUN 11 04/02/2024    CO2 30 04/02/2024    TSH 1.72 07/25/2023    PSA 2.04 06/17/2020    INR 0.9 11/01/2022       Assessment/Plan   Problem List Items Addressed This Visit          Cardiac and Vasculature    Benign essential hypertension     Well controlled  /74 in office   Continue current medication         Mixed hyperlipidemia     Lipids within normal limits   Continue current medications             Genitourinary and Reproductive    BPH (benign prostatic hyperplasia)     No new concerns   Continue  current medication         Right nephrolithiasis       Mental Health    Anxiety disorder     Well controlled  Continue current medication          Other Visit Diagnoses       Hyperlipidemia, unspecified hyperlipidemia type                Continue current medications as listed  Follow up in 3 months for annual

## 2024-04-22 ENCOUNTER — APPOINTMENT (OUTPATIENT)
Dept: NEUROLOGY | Facility: CLINIC | Age: 74
End: 2024-04-22
Payer: MEDICARE

## 2024-04-22 ENCOUNTER — OFFICE VISIT (OUTPATIENT)
Dept: NEUROLOGY | Facility: CLINIC | Age: 74
End: 2024-04-22
Payer: MEDICARE

## 2024-04-22 VITALS
HEART RATE: 66 BPM | WEIGHT: 254 LBS | TEMPERATURE: 95.7 F | SYSTOLIC BLOOD PRESSURE: 106 MMHG | HEIGHT: 71 IN | BODY MASS INDEX: 35.56 KG/M2 | DIASTOLIC BLOOD PRESSURE: 70 MMHG

## 2024-04-22 DIAGNOSIS — G44.229 CHRONIC TENSION-TYPE HEADACHE, NOT INTRACTABLE: Primary | ICD-10-CM

## 2024-04-22 PROCEDURE — 1036F TOBACCO NON-USER: CPT | Performed by: PSYCHIATRY & NEUROLOGY

## 2024-04-22 PROCEDURE — 3078F DIAST BP <80 MM HG: CPT | Performed by: PSYCHIATRY & NEUROLOGY

## 2024-04-22 PROCEDURE — 3074F SYST BP LT 130 MM HG: CPT | Performed by: PSYCHIATRY & NEUROLOGY

## 2024-04-22 PROCEDURE — 1160F RVW MEDS BY RX/DR IN RCRD: CPT | Performed by: PSYCHIATRY & NEUROLOGY

## 2024-04-22 PROCEDURE — 99214 OFFICE O/P EST MOD 30 MIN: CPT | Performed by: PSYCHIATRY & NEUROLOGY

## 2024-04-22 PROCEDURE — 1159F MED LIST DOCD IN RCRD: CPT | Performed by: PSYCHIATRY & NEUROLOGY

## 2024-04-22 RX ORDER — GABAPENTIN 300 MG/1
300 CAPSULE ORAL 3 TIMES DAILY
Qty: 270 CAPSULE | Refills: 3 | Status: SHIPPED | OUTPATIENT
Start: 2024-04-22 | End: 2025-04-22

## 2024-04-22 NOTE — LETTER
April 22, 2024     Tone Shin MD  [address]  [facility], [address]  [facility] [id number]    Patient: Seth Antony   YOB: 1950   Date of Visit: 4/22/2024       Dear Dr. Tone Shin MD:    Thank you for allowing me to continue in the neurological care of your patient, Seth Antony. Below are my notes for this visit.  If you have questions, please do not hesitate to call me.       Sincerely,     Inocencio Baker Jr., M.D., FAAN     ______________________________________________________________________________________    NEUROLOGY OUTPATIENT FOLLOW-UP NOTE    Assessment/Plan  Diagnoses and all orders for this visit:  Chronic tension-type headache, not intractable  -     gabapentin (Neurontin) 300 mg capsule; Take 1 capsule (300 mg) by mouth 3 times a day.      IMPRESSION:  Stable tension-type headaches.    PLAN:  I refilled gabapentin and will see him back in another year or prn.      Inocencio Baker Jr., M.D., FAAN   ----------    I have personally reviewed the OARRS report for Seth Antony   I have considered the risks of abuse, dependence, addiction and diversion.   Controlled Substance Agreement:   I have printed this form and reviewed each line item with the patient and the patient has verbalized understanding.   Date of the last Controlled Substance Agreement:  4/22/2024    Subjective    Seth Antony is a 74 y.o. year old male here for follow-up.    Gabapentin 300 mg tid continues to essentially resolve the headaches.  If he misses more than two doses, the headaches begin again.  He denies other focal neurological symptoms including dysarthria, dysphagia, diplopia, focal weakness, focal sensory change, ataxia, vertigo, or bowel/bladder incontinence, among others.        Past Medical History:   Diagnosis Date   • Encounter for general adult medical examination without abnormal findings 03/17/2022    Encounter for annual health examination   • Encounter for general adult  medical examination without abnormal findings 03/02/2021    Encounter for annual health examination   • Encounter for general adult medical examination without abnormal findings 04/22/2019    Encounter for annual health examination   • Encounter for screening for malignant neoplasm of prostate 07/01/2021    Encounter for prostate cancer screening   • Encounter for screening for malignant neoplasm of prostate 03/02/2021    Encounter for prostate cancer screening   • Essential (primary) hypertension 07/13/2022    Benign essential hypertension   • Hyperlipidemia, unspecified 11/15/2022    Hyperlipidemia   • Personal history of other diseases of male genital organs     History of prostatitis   • Personal history of other specified conditions     History of urinary frequency   • Unspecified atrial fibrillation (Multi) 08/04/2022    Atrial fibrillation     Past Surgical History:   Procedure Laterality Date   • COLONOSCOPY  04/05/2013    Complete Colonoscopy   • OTHER SURGICAL HISTORY  08/04/2022    Right hemicolectomy   • OTHER SURGICAL HISTORY  08/04/2022    Hernia repair   • OTHER SURGICAL HISTORY  08/04/2022    Cataract surgery     Social History     Tobacco Use   • Smoking status: Former     Types: Cigarettes   • Smokeless tobacco: Never   Substance Use Topics   • Alcohol use: Yes     Comment: Seldom     family history includes Cancer in his mother; Heart attack in his sister; cardiac issues in his father.    Current Outpatient Medications:   •  busPIRone (Buspar) 30 mg tablet, Take 1 tablet (30 mg) by mouth 2 times a day., Disp: 180 tablet, Rfl: 3  •  ergocalciferol (Vitamin D-2) 1.25 MG (80698 UT) capsule, Take 1 capsule (1,250 mcg) by mouth once a week., Disp: , Rfl:   •  fenofibrate (Triglide) 160 mg tablet, Take 1 tablet (160 mg) by mouth once daily., Disp: 90 tablet, Rfl: 3  •  finasteride (Proscar) 5 mg tablet, Take 1 tablet (5 mg) by mouth once daily. Do not crush, chew, or split., Disp: 90 tablet, Rfl: 3  •   "fish oil-dha-epa 1,200-144-216 mg capsule, Take by mouth., Disp: , Rfl:   •  fluticasone (Flonase) 50 mcg/actuation nasal spray, Administer 1 spray into each nostril once daily. Shake gently. Before first use, prime pump. After use, clean tip and replace cap. (Patient not taking: Reported on 4/22/2024), Disp: 16 g, Rfl: 11  •  furosemide (Lasix) 20 mg tablet, Take 1 tablet (20 mg) by mouth once daily., Disp: 90 tablet, Rfl: 3  •  gabapentin (Neurontin) 300 mg capsule, Take 1 capsule (300 mg) by mouth 3 times a day., Disp: , Rfl:   •  metoprolol succinate XL (Toprol-XL) 100 mg 24 hr tablet, Take 1 tablet (100 mg) by mouth once daily. Do not crush or chew., Disp: , Rfl:   •  metoprolol succinate XL (Toprol-XL) 200 mg 24 hr tablet, Take 1 tablet (200 mg) by mouth once daily., Disp: 90 tablet, Rfl: 3  •  multivitamin capsule, Take 1 capsule by mouth once daily., Disp: , Rfl:   •  omeprazole (PriLOSEC) 40 mg DR capsule, Take 1 capsule (40 mg) by mouth once daily., Disp: 90 capsule, Rfl: 3  •  rosuvastatin (Crestor) 20 mg tablet, Take 1 tablet (20 mg) by mouth once daily., Disp: 90 tablet, Rfl: 3  •  tadalafil 20 mg tablet, Take by mouth., Disp: , Rfl:   •  tamsulosin (Flomax) 0.4 mg 24 hr capsule, Take 1 capsule (0.4 mg) by mouth 2 times a day., Disp: 180 capsule, Rfl: 3  Allergies   Allergen Reactions   • Nut - Unspecified Other     hx of diverticulitis - not eating nuts       Objective    /70   Pulse 66   Temp 35.4 °C (95.7 °F)   Ht 1.803 m (5' 11\")   Wt 115 kg (254 lb)   BMI 35.43 kg/m²     CONSTITUTIONAL:  No acute distress    MENTAL STATUS:  Awake, alert, fully oriented to self, place, and time, with present short-term memory, good awareness of recent events, normal attention span, concentration, and fund of knowledge.    SPEECH AND LANGUAGE:  Can name and repeat, follows all commands, has no dysarthria    CRANIAL NERVES:  II-Vision present, visual fields full to confrontational testing    III/IV/VI--EOMs " are present in all directions.  Pupils are symmetrically reactive in dim light.  No ptosis.    V--Normal facial sensation.    VII--No facial asymmetry.    VIII--Hearing present to voice bilaterally.    IX/X--Symmetric soft palate rise.    XI--Normal trapezius power bilaterally.    XII--Tongue protrudes without deviation.    MOTOR:  Normal power, tone, and bulk in both arms and both legs.    SENSORY:  Normal pin sensation in both arms and both legs without distal-proximal gradient, asymmetry, or spinal sensory level.    COORDINATION:  Normal finger-to-nose and heel-to-shin testing in both arms and both legs.    REFLEXES are normal and symmetric at the biceps, triceps, brachioradialis, patella, and ankle.  The plantar responses are flexor.    GAIT is normal, without steppage, ataxia, shuffling, or spasticity.      Inocencio Baker Jr., M.D., [facility]

## 2024-04-22 NOTE — PROGRESS NOTES
NEUROLOGY OUTPATIENT FOLLOW-UP NOTE    Assessment/Plan   Diagnoses and all orders for this visit:  Chronic tension-type headache, not intractable  -     gabapentin (Neurontin) 300 mg capsule; Take 1 capsule (300 mg) by mouth 3 times a day.      IMPRESSION:  Stable tension-type headaches.    PLAN:  I refilled gabapentin and will see him back in another year or prn.      Inocencio Baker Jr., M.D., FAAN   ----------    I have personally reviewed the OARRS report for Seth Antony   I have considered the risks of abuse, dependence, addiction and diversion.   Controlled Substance Agreement:   I have printed this form and reviewed each line item with the patient and the patient has verbalized understanding.   Date of the last Controlled Substance Agreement:  4/22/2024    Subjective     Seth Antony is a 74 y.o. year old male here for follow-up.    Gabapentin 300 mg tid continues to essentially resolve the headaches.  If he misses more than two doses, the headaches begin again.  He denies other focal neurological symptoms including dysarthria, dysphagia, diplopia, focal weakness, focal sensory change, ataxia, vertigo, or bowel/bladder incontinence, among others.        Past Medical History:   Diagnosis Date    Encounter for general adult medical examination without abnormal findings 03/17/2022    Encounter for annual health examination    Encounter for general adult medical examination without abnormal findings 03/02/2021    Encounter for annual health examination    Encounter for general adult medical examination without abnormal findings 04/22/2019    Encounter for annual health examination    Encounter for screening for malignant neoplasm of prostate 07/01/2021    Encounter for prostate cancer screening    Encounter for screening for malignant neoplasm of prostate 03/02/2021    Encounter for prostate cancer screening    Essential (primary) hypertension 07/13/2022    Benign essential hypertension    Hyperlipidemia,  unspecified 11/15/2022    Hyperlipidemia    Personal history of other diseases of male genital organs     History of prostatitis    Personal history of other specified conditions     History of urinary frequency    Unspecified atrial fibrillation (Multi) 08/04/2022    Atrial fibrillation     Past Surgical History:   Procedure Laterality Date    COLONOSCOPY  04/05/2013    Complete Colonoscopy    OTHER SURGICAL HISTORY  08/04/2022    Right hemicolectomy    OTHER SURGICAL HISTORY  08/04/2022    Hernia repair    OTHER SURGICAL HISTORY  08/04/2022    Cataract surgery     Social History     Tobacco Use    Smoking status: Former     Types: Cigarettes    Smokeless tobacco: Never   Substance Use Topics    Alcohol use: Yes     Comment: Seldom     family history includes Cancer in his mother; Heart attack in his sister; cardiac issues in his father.    Current Outpatient Medications:     busPIRone (Buspar) 30 mg tablet, Take 1 tablet (30 mg) by mouth 2 times a day., Disp: 180 tablet, Rfl: 3    ergocalciferol (Vitamin D-2) 1.25 MG (65387 UT) capsule, Take 1 capsule (1,250 mcg) by mouth once a week., Disp: , Rfl:     fenofibrate (Triglide) 160 mg tablet, Take 1 tablet (160 mg) by mouth once daily., Disp: 90 tablet, Rfl: 3    finasteride (Proscar) 5 mg tablet, Take 1 tablet (5 mg) by mouth once daily. Do not crush, chew, or split., Disp: 90 tablet, Rfl: 3    fish oil-dha-epa 1,200-144-216 mg capsule, Take by mouth., Disp: , Rfl:     fluticasone (Flonase) 50 mcg/actuation nasal spray, Administer 1 spray into each nostril once daily. Shake gently. Before first use, prime pump. After use, clean tip and replace cap. (Patient not taking: Reported on 4/22/2024), Disp: 16 g, Rfl: 11    furosemide (Lasix) 20 mg tablet, Take 1 tablet (20 mg) by mouth once daily., Disp: 90 tablet, Rfl: 3    gabapentin (Neurontin) 300 mg capsule, Take 1 capsule (300 mg) by mouth 3 times a day., Disp: , Rfl:     metoprolol succinate XL (Toprol-XL) 100 mg 24  "hr tablet, Take 1 tablet (100 mg) by mouth once daily. Do not crush or chew., Disp: , Rfl:     metoprolol succinate XL (Toprol-XL) 200 mg 24 hr tablet, Take 1 tablet (200 mg) by mouth once daily., Disp: 90 tablet, Rfl: 3    multivitamin capsule, Take 1 capsule by mouth once daily., Disp: , Rfl:     omeprazole (PriLOSEC) 40 mg DR capsule, Take 1 capsule (40 mg) by mouth once daily., Disp: 90 capsule, Rfl: 3    rosuvastatin (Crestor) 20 mg tablet, Take 1 tablet (20 mg) by mouth once daily., Disp: 90 tablet, Rfl: 3    tadalafil 20 mg tablet, Take by mouth., Disp: , Rfl:     tamsulosin (Flomax) 0.4 mg 24 hr capsule, Take 1 capsule (0.4 mg) by mouth 2 times a day., Disp: 180 capsule, Rfl: 3  Allergies   Allergen Reactions    Nut - Unspecified Other     hx of diverticulitis - not eating nuts       Objective     /70   Pulse 66   Temp 35.4 °C (95.7 °F)   Ht 1.803 m (5' 11\")   Wt 115 kg (254 lb)   BMI 35.43 kg/m²     CONSTITUTIONAL:  No acute distress    MENTAL STATUS:  Awake, alert, fully oriented to self, place, and time, with present short-term memory, good awareness of recent events, normal attention span, concentration, and fund of knowledge.    SPEECH AND LANGUAGE:  Can name and repeat, follows all commands, has no dysarthria    CRANIAL NERVES:  II-Vision present, visual fields full to confrontational testing    III/IV/VI--EOMs are present in all directions.  Pupils are symmetrically reactive in dim light.  No ptosis.    V--Normal facial sensation.    VII--No facial asymmetry.    VIII--Hearing present to voice bilaterally.    IX/X--Symmetric soft palate rise.    XI--Normal trapezius power bilaterally.    XII--Tongue protrudes without deviation.    MOTOR:  Normal power, tone, and bulk in both arms and both legs.    SENSORY:  Normal pin sensation in both arms and both legs without distal-proximal gradient, asymmetry, or spinal sensory level.    COORDINATION:  Normal finger-to-nose and heel-to-shin testing in " both arms and both legs.    REFLEXES are normal and symmetric at the biceps, triceps, brachioradialis, patella, and ankle.  The plantar responses are flexor.    GAIT is normal, without steppage, ataxia, shuffling, or spasticity.      Inocencio Baker Jr., M.D., [facility]

## 2024-07-30 ENCOUNTER — LAB (OUTPATIENT)
Dept: LAB | Facility: LAB | Age: 74
End: 2024-07-30
Payer: MEDICARE

## 2024-07-30 DIAGNOSIS — Z00.00 ANNUAL PHYSICAL EXAM: ICD-10-CM

## 2024-07-30 DIAGNOSIS — E78.2 MIXED HYPERLIPIDEMIA: ICD-10-CM

## 2024-07-30 DIAGNOSIS — E55.9 VITAMIN D DEFICIENCY: ICD-10-CM

## 2024-07-30 LAB
25(OH)D3 SERPL-MCNC: 37 NG/ML (ref 30–100)
ALBUMIN SERPL BCP-MCNC: 4.2 G/DL (ref 3.4–5)
ALP SERPL-CCNC: 39 U/L (ref 33–136)
ALT SERPL W P-5'-P-CCNC: 13 U/L (ref 10–52)
ANION GAP SERPL CALC-SCNC: 12 MMOL/L (ref 10–20)
AST SERPL W P-5'-P-CCNC: 19 U/L (ref 9–39)
BILIRUB SERPL-MCNC: 0.8 MG/DL (ref 0–1.2)
BUN SERPL-MCNC: 18 MG/DL (ref 6–23)
CALCIUM SERPL-MCNC: 10.1 MG/DL (ref 8.6–10.6)
CHLORIDE SERPL-SCNC: 100 MMOL/L (ref 98–107)
CHOLEST SERPL-MCNC: 146 MG/DL (ref 0–199)
CHOLESTEROL/HDL RATIO: 4.5
CO2 SERPL-SCNC: 31 MMOL/L (ref 21–32)
CREAT SERPL-MCNC: 1.45 MG/DL (ref 0.5–1.3)
EGFRCR SERPLBLD CKD-EPI 2021: 51 ML/MIN/1.73M*2
ERYTHROCYTE [DISTWIDTH] IN BLOOD BY AUTOMATED COUNT: 12.3 % (ref 11.5–14.5)
GLUCOSE SERPL-MCNC: 96 MG/DL (ref 74–99)
HCT VFR BLD AUTO: 45.7 % (ref 41–52)
HDLC SERPL-MCNC: 32.7 MG/DL
HGB BLD-MCNC: 15.1 G/DL (ref 13.5–17.5)
LDLC SERPL CALC-MCNC: 79 MG/DL
MCH RBC QN AUTO: 30.8 PG (ref 26–34)
MCHC RBC AUTO-ENTMCNC: 33 G/DL (ref 32–36)
MCV RBC AUTO: 93 FL (ref 80–100)
NON HDL CHOLESTEROL: 113 MG/DL (ref 0–149)
NRBC BLD-RTO: 0 /100 WBCS (ref 0–0)
PLATELET # BLD AUTO: 259 X10*3/UL (ref 150–450)
POTASSIUM SERPL-SCNC: 4.4 MMOL/L (ref 3.5–5.3)
PROT SERPL-MCNC: 6.5 G/DL (ref 6.4–8.2)
PSA SERPL-MCNC: 0.96 NG/ML
RBC # BLD AUTO: 4.9 X10*6/UL (ref 4.5–5.9)
SODIUM SERPL-SCNC: 139 MMOL/L (ref 136–145)
TRIGL SERPL-MCNC: 173 MG/DL (ref 0–149)
VLDL: 35 MG/DL (ref 0–40)
WBC # BLD AUTO: 4.1 X10*3/UL (ref 4.4–11.3)

## 2024-07-30 PROCEDURE — 36415 COLL VENOUS BLD VENIPUNCTURE: CPT

## 2024-08-08 ENCOUNTER — APPOINTMENT (OUTPATIENT)
Dept: PRIMARY CARE | Facility: CLINIC | Age: 74
End: 2024-08-08
Payer: MEDICARE

## 2024-08-08 VITALS
SYSTOLIC BLOOD PRESSURE: 102 MMHG | RESPIRATION RATE: 12 BRPM | OXYGEN SATURATION: 95 % | DIASTOLIC BLOOD PRESSURE: 62 MMHG | HEART RATE: 72 BPM | WEIGHT: 239 LBS | BODY MASS INDEX: 33.33 KG/M2

## 2024-08-08 DIAGNOSIS — I10 BENIGN ESSENTIAL HYPERTENSION: ICD-10-CM

## 2024-08-08 DIAGNOSIS — E55.9 VITAMIN D DEFICIENCY: ICD-10-CM

## 2024-08-08 DIAGNOSIS — E78.2 MIXED HYPERLIPIDEMIA: ICD-10-CM

## 2024-08-08 DIAGNOSIS — N21.0 BLADDER STONE: Primary | ICD-10-CM

## 2024-08-08 DIAGNOSIS — Z00.00 ENCOUNTER FOR ANNUAL WELLNESS VISIT (AWV) IN MEDICARE PATIENT: ICD-10-CM

## 2024-08-08 DIAGNOSIS — N18.31 STAGE 3A CHRONIC KIDNEY DISEASE (MULTI): ICD-10-CM

## 2024-08-08 LAB
POC APPEARANCE, URINE: CLEAR
POC BILIRUBIN, URINE: NEGATIVE
POC BLOOD, URINE: NEGATIVE
POC COLOR, URINE: NORMAL
POC GLUCOSE, URINE: NEGATIVE MG/DL
POC KETONES, URINE: NEGATIVE MG/DL
POC LEUKOCYTES, URINE: NEGATIVE
POC NITRITE,URINE: NEGATIVE
POC OCCULT BLOOD STOOL #1: NEGATIVE
POC PH, URINE: 5.5 PH
POC PROTEIN, URINE: NEGATIVE MG/DL
POC SPECIFIC GRAVITY, URINE: 1.01
POC UROBILINOGEN, URINE: 0.2 EU/DL

## 2024-08-08 ASSESSMENT — PATIENT HEALTH QUESTIONNAIRE - PHQ9
1. LITTLE INTEREST OR PLEASURE IN DOING THINGS: NOT AT ALL
SUM OF ALL RESPONSES TO PHQ9 QUESTIONS 1 AND 2: 0
2. FEELING DOWN, DEPRESSED OR HOPELESS: NOT AT ALL

## 2024-08-08 ASSESSMENT — ENCOUNTER SYMPTOMS
DEPRESSION: 0
LOSS OF SENSATION IN FEET: 0
OCCASIONAL FEELINGS OF UNSTEADINESS: 0

## 2024-08-08 ASSESSMENT — ACTIVITIES OF DAILY LIVING (ADL)
DRESSING: INDEPENDENT
BATHING: INDEPENDENT

## 2024-08-08 NOTE — PROGRESS NOTES
ANNUAL WELLNESS VISIT    Subjective :  Chief Complaint: Seth Antony is an 74 y.o. male here for an annual wellness visit and general medical care and f/u.     HPI:  Patient here for annual visit. He has a history of HTN, HLD, vitamin D deficiency. He has no complaints today. Labs and EKG reviewed with patient.         Objective   /62   Pulse 72   Resp 12   Wt 108 kg (239 lb)   SpO2 95%   BMI 33.33 kg/m²     Physical Exam  HENT:      Head: Normocephalic.      Nose: Nose normal.      Mouth/Throat:      Mouth: Mucous membranes are moist.      Pharynx: Oropharynx is clear.   Eyes:      Pupils: Pupils are equal, round, and reactive to light.   Cardiovascular:      Rate and Rhythm: Normal rate and regular rhythm.      Pulses: Normal pulses.      Heart sounds: Normal heart sounds.   Pulmonary:      Effort: Pulmonary effort is normal.      Breath sounds: Normal breath sounds.   Abdominal:      General: Bowel sounds are normal.   Musculoskeletal:         General: Normal range of motion.      Cervical back: Normal range of motion.   Skin:     General: Skin is warm and dry.      Capillary Refill: Capillary refill takes less than 2 seconds.   Neurological:      Mental Status: He is alert and oriented to person, place, and time.         Imaging:  No results found.     Labs reviewed:    Lab Results   Component Value Date    WBC 4.1 (L) 07/30/2024    HGB 15.1 07/30/2024    HCT 45.7 07/30/2024     07/30/2024    CHOL 146 07/30/2024    TRIG 173 (H) 07/30/2024    HDL 32.7 07/30/2024    ALT 13 07/30/2024    AST 19 07/30/2024     07/30/2024    K 4.4 07/30/2024     07/30/2024    CREATININE 1.45 (H) 07/30/2024    BUN 18 07/30/2024    CO2 31 07/30/2024    TSH 1.72 07/25/2023    PSA 2.04 06/17/2020    INR 0.9 11/01/2022       Past Medical, Surgical, and Family History reviewed and updated in chart.    I have reviewed and reconciled the medication list with the patient today.   Current Outpatient Medications:      busPIRone (Buspar) 30 mg tablet, Take 1 tablet (30 mg) by mouth 2 times a day., Disp: 180 tablet, Rfl: 3    ergocalciferol (Vitamin D-2) 1.25 MG (12673 UT) capsule, Take 1 capsule (1,250 mcg) by mouth once a week., Disp: , Rfl:     fenofibrate (Triglide) 160 mg tablet, Take 1 tablet (160 mg) by mouth once daily., Disp: 90 tablet, Rfl: 3    finasteride (Proscar) 5 mg tablet, Take 1 tablet (5 mg) by mouth once daily. Do not crush, chew, or split., Disp: 90 tablet, Rfl: 3    fish oil-dha-epa 1,200-144-216 mg capsule, Take by mouth., Disp: , Rfl:     furosemide (Lasix) 20 mg tablet, Take 1 tablet (20 mg) by mouth once daily., Disp: 90 tablet, Rfl: 3    gabapentin (Neurontin) 300 mg capsule, Take 1 capsule (300 mg) by mouth 3 times a day., Disp: 270 capsule, Rfl: 3    metoprolol succinate XL (Toprol-XL) 100 mg 24 hr tablet, Take 1 tablet (100 mg) by mouth once daily. Do not crush or chew., Disp: , Rfl:     metoprolol succinate XL (Toprol-XL) 200 mg 24 hr tablet, Take 1 tablet (200 mg) by mouth once daily., Disp: 90 tablet, Rfl: 3    omeprazole (PriLOSEC) 40 mg DR capsule, Take 1 capsule (40 mg) by mouth once daily., Disp: 90 capsule, Rfl: 3    rosuvastatin (Crestor) 20 mg tablet, Take 1 tablet (20 mg) by mouth once daily., Disp: 90 tablet, Rfl: 3    tadalafil 20 mg tablet, Take by mouth., Disp: , Rfl:     tamsulosin (Flomax) 0.4 mg 24 hr capsule, Take 1 capsule (0.4 mg) by mouth 2 times a day., Disp: 180 capsule, Rfl: 3    multivitamin capsule, Take 1 capsule by mouth once daily., Disp: , Rfl:      List of current healthcare providers:  Patient Care Team:  Tone Shin MD as PCP - General     HRA:  Over the past 2 weeks, how often have you been bothered by any of the following problems?  Little interest or pleasure in doing things: Not at all  Feeling down, depressed, or hopeless: Not at all  Patient Health Questionnaire-2 Score: 0    Steadi Fall Risk  One or more falls in the last year? No  How many Times?    Was  the patient injured in the fall?    Has trouble stepping onto curb? No  Advised to use a cane or walker to get around safely? No  Often has to rush to toilet? No  Feels unsteady when walking? No  Has lost some feeling in feet? No  Often feels sad or depressed? No  Steadies self on furniture while walking at home? No  Takes medicine that makes them feel lightheaded or more tired than usual? No  Worried about Falling? No  Takes medicine to sleep or improve mood? No  Needs to push with hands when rising from a chair? No            Falls Home Safety Risk Factors  Home Safety Risk Factors: None    Functional Ability/Level of Safety  Cognitive Impairment Observed: No cognitive impairment observed    Patient Self Assessment of Health Status  Patient Self Assessment: Good    Nutrition and Exercise  Current Diet: Well Balanced Diet  Adequate Fluid Intake: Yes  Caffeine: Yes  Exercise Frequency: Regularly    ADL Screening  Hearing - Right Ear: Functional  Hearing - Left Ear: Functional  Bathing: Independent  Dressing: Independent  Walks in Home: Independent         Assessment/Plan :  Problem List Items Addressed This Visit          Cardiac and Vasculature    Benign essential hypertension     Well controlled  /62 in office   Continue current medication         Relevant Orders    ECG 12 lead (Clinic Performed)    Mixed hyperlipidemia     Triglycerides 173  Continue current medications.             Endocrine/Metabolic    Vitamin D deficiency     Vitamin D 37, stable.             Genitourinary and Reproductive    CKD (chronic kidney disease)     Creatnine 1.45 increased from 1.09  GFR 51 decreased from 72  Decrease Lasix to 2x per week.          Bladder stone - Primary     Continue following with urology.             Health Encounters    Encounter for annual wellness visit (AWV) in Medicare patient     EKG, stool sample, UA WNL           Relevant Orders    POCT UA (nonautomated) manually resulted (Completed)    POCT occult  blood stool manually resulted (Completed)    ECG 12 lead (Clinic Performed)     The following health maintenance schedule was reviewed with the patient and provided in printed form in the after visit summary:  Health Maintenance   Topic Date Due    Influenza Vaccine (1) 09/01/2024    Colorectal Cancer Screening  07/21/2025    Medicare Annual Wellness Visit (AWV)  08/09/2025    DTaP/Tdap/Td Vaccines (2 - Td or Tdap) 06/27/2026    Lipid Panel  07/30/2029    Abdominal Aortic Aneurysm (AAA) Screening  Completed    HIB Vaccines  Aged Out    Hepatitis B Vaccines  Aged Out    IPV Vaccines  Aged Out    Hepatitis A Vaccines  Aged Out    Meningococcal Vaccine  Aged Out    Rotavirus Vaccines  Aged Out    HPV Vaccines  Aged Out    RSV Pregnant patients and/or  patients aged 60+ years  Discontinued    Pneumococcal Vaccine: 65+ Years  Discontinued    Zoster Vaccines  Discontinued    Hepatitis C Screening  Discontinued    Diabetes Screening  Discontinued    COVID-19 Vaccine  Discontinued    CKD: Urine Protein Screening  Discontinued    Irritable Bowel Syndrome  Discontinued       Advance Care Planning   na             Orders Placed This Encounter   Procedures    POCT UA (nonautomated) manually resulted     Order Specific Question:   Release result to [employer]     Answer:   Immediate [1]    POCT occult blood stool manually resulted     Order Specific Question:   Release result to [employer]     Answer:   Immediate [1]    ECG 12 lead (Clinic Performed)     Order Specific Question:   Reason for Exam:     Answer:   htn. physcial       Continue current medications as listed  Follow up in 3 months.

## 2024-10-12 DIAGNOSIS — R10.9 ABDOMINAL PAIN, UNSPECIFIED ABDOMINAL LOCATION: ICD-10-CM

## 2024-10-14 RX ORDER — OMEPRAZOLE 40 MG/1
40 CAPSULE, DELAYED RELEASE ORAL DAILY
Qty: 90 CAPSULE | Refills: 0 | Status: SHIPPED | OUTPATIENT
Start: 2024-10-14

## 2024-12-04 ENCOUNTER — TELEPHONE (OUTPATIENT)
Dept: PRIMARY CARE | Facility: CLINIC | Age: 74
End: 2024-12-04

## 2024-12-04 ENCOUNTER — LAB (OUTPATIENT)
Dept: LAB | Facility: LAB | Age: 74
End: 2024-12-04
Payer: MEDICARE

## 2024-12-04 DIAGNOSIS — E78.2 MIXED HYPERLIPIDEMIA: Primary | ICD-10-CM

## 2024-12-05 ENCOUNTER — LAB (OUTPATIENT)
Dept: LAB | Facility: LAB | Age: 74
End: 2024-12-05
Payer: MEDICARE

## 2024-12-05 DIAGNOSIS — E78.2 MIXED HYPERLIPIDEMIA: ICD-10-CM

## 2024-12-05 LAB
ALBUMIN SERPL BCP-MCNC: 4.3 G/DL (ref 3.4–5)
ALP SERPL-CCNC: 48 U/L (ref 33–136)
ALT SERPL W P-5'-P-CCNC: 13 U/L (ref 10–52)
ANION GAP SERPL CALC-SCNC: 13 MMOL/L (ref 10–20)
AST SERPL W P-5'-P-CCNC: 16 U/L (ref 9–39)
BILIRUB SERPL-MCNC: 0.7 MG/DL (ref 0–1.2)
BUN SERPL-MCNC: 12 MG/DL (ref 6–23)
CALCIUM SERPL-MCNC: 9.7 MG/DL (ref 8.6–10.6)
CHLORIDE SERPL-SCNC: 103 MMOL/L (ref 98–107)
CHOLEST SERPL-MCNC: 144 MG/DL (ref 0–199)
CHOLESTEROL/HDL RATIO: 3.6
CO2 SERPL-SCNC: 31 MMOL/L (ref 21–32)
CREAT SERPL-MCNC: 1.19 MG/DL (ref 0.5–1.3)
EGFRCR SERPLBLD CKD-EPI 2021: 64 ML/MIN/1.73M*2
ERYTHROCYTE [DISTWIDTH] IN BLOOD BY AUTOMATED COUNT: 13.2 % (ref 11.5–14.5)
GLUCOSE SERPL-MCNC: 84 MG/DL (ref 74–99)
HCT VFR BLD AUTO: 48.4 % (ref 41–52)
HDLC SERPL-MCNC: 39.6 MG/DL
HGB BLD-MCNC: 15.5 G/DL (ref 13.5–17.5)
LDLC SERPL CALC-MCNC: 77 MG/DL
MCH RBC QN AUTO: 31.3 PG (ref 26–34)
MCHC RBC AUTO-ENTMCNC: 32 G/DL (ref 32–36)
MCV RBC AUTO: 98 FL (ref 80–100)
NON HDL CHOLESTEROL: 104 MG/DL (ref 0–149)
NRBC BLD-RTO: 0 /100 WBCS (ref 0–0)
PLATELET # BLD AUTO: 207 X10*3/UL (ref 150–450)
POTASSIUM SERPL-SCNC: 4.5 MMOL/L (ref 3.5–5.3)
PROT SERPL-MCNC: 6.7 G/DL (ref 6.4–8.2)
RBC # BLD AUTO: 4.96 X10*6/UL (ref 4.5–5.9)
SODIUM SERPL-SCNC: 142 MMOL/L (ref 136–145)
TRIGL SERPL-MCNC: 136 MG/DL (ref 0–149)
VLDL: 27 MG/DL (ref 0–40)
WBC # BLD AUTO: 4.4 X10*3/UL (ref 4.4–11.3)

## 2024-12-05 PROCEDURE — 80061 LIPID PANEL: CPT

## 2024-12-05 PROCEDURE — 85027 COMPLETE CBC AUTOMATED: CPT

## 2024-12-05 PROCEDURE — 36415 COLL VENOUS BLD VENIPUNCTURE: CPT

## 2024-12-05 PROCEDURE — 80053 COMPREHEN METABOLIC PANEL: CPT

## 2024-12-09 ENCOUNTER — TELEPHONE (OUTPATIENT)
Dept: PRIMARY CARE | Facility: CLINIC | Age: 74
End: 2024-12-09

## 2024-12-09 ENCOUNTER — APPOINTMENT (OUTPATIENT)
Dept: PRIMARY CARE | Facility: CLINIC | Age: 74
End: 2024-12-09
Payer: MEDICARE

## 2024-12-09 VITALS
HEART RATE: 63 BPM | SYSTOLIC BLOOD PRESSURE: 138 MMHG | OXYGEN SATURATION: 97 % | BODY MASS INDEX: 34.03 KG/M2 | WEIGHT: 244 LBS | RESPIRATION RATE: 12 BRPM | DIASTOLIC BLOOD PRESSURE: 84 MMHG

## 2024-12-09 DIAGNOSIS — I10 BENIGN ESSENTIAL HYPERTENSION: ICD-10-CM

## 2024-12-09 DIAGNOSIS — N18.31 STAGE 3A CHRONIC KIDNEY DISEASE (MULTI): ICD-10-CM

## 2024-12-09 DIAGNOSIS — E78.5 HYPERLIPIDEMIA, UNSPECIFIED HYPERLIPIDEMIA TYPE: ICD-10-CM

## 2024-12-09 DIAGNOSIS — I48.91 ATRIAL FIBRILLATION, UNSPECIFIED TYPE (MULTI): Primary | ICD-10-CM

## 2024-12-09 DIAGNOSIS — E78.2 MIXED HYPERLIPIDEMIA: Primary | ICD-10-CM

## 2024-12-09 DIAGNOSIS — F41.9 ANXIETY DISORDER, UNSPECIFIED TYPE: ICD-10-CM

## 2024-12-09 DIAGNOSIS — R10.9 ABDOMINAL PAIN, UNSPECIFIED ABDOMINAL LOCATION: ICD-10-CM

## 2024-12-09 DIAGNOSIS — E78.2 MIXED HYPERLIPIDEMIA: ICD-10-CM

## 2024-12-09 PROCEDURE — 3079F DIAST BP 80-89 MM HG: CPT | Performed by: INTERNAL MEDICINE

## 2024-12-09 PROCEDURE — 1159F MED LIST DOCD IN RCRD: CPT | Performed by: INTERNAL MEDICINE

## 2024-12-09 PROCEDURE — 3075F SYST BP GE 130 - 139MM HG: CPT | Performed by: INTERNAL MEDICINE

## 2024-12-09 PROCEDURE — 1036F TOBACCO NON-USER: CPT | Performed by: INTERNAL MEDICINE

## 2024-12-09 PROCEDURE — 99214 OFFICE O/P EST MOD 30 MIN: CPT | Performed by: INTERNAL MEDICINE

## 2024-12-09 RX ORDER — METOPROLOL SUCCINATE 200 MG/1
200 TABLET, EXTENDED RELEASE ORAL DAILY
Qty: 90 TABLET | Refills: 3 | Status: SHIPPED | OUTPATIENT
Start: 2024-12-09

## 2024-12-09 RX ORDER — FENOFIBRATE 160 MG/1
160 TABLET ORAL
Qty: 90 TABLET | Refills: 3 | Status: SHIPPED | OUTPATIENT
Start: 2024-12-09

## 2024-12-09 RX ORDER — OMEPRAZOLE 40 MG/1
40 CAPSULE, DELAYED RELEASE ORAL DAILY
Qty: 90 CAPSULE | Refills: 0 | Status: SHIPPED | OUTPATIENT
Start: 2024-12-09

## 2024-12-09 RX ORDER — HYDROXYZINE PAMOATE 25 MG/1
25 CAPSULE ORAL 3 TIMES DAILY PRN
Qty: 30 CAPSULE | Refills: 0 | Status: SHIPPED | OUTPATIENT
Start: 2024-12-09 | End: 2024-12-19

## 2024-12-09 RX ORDER — ROSUVASTATIN CALCIUM 20 MG/1
20 TABLET, COATED ORAL DAILY
Qty: 90 TABLET | Refills: 3 | Status: SHIPPED | OUTPATIENT
Start: 2024-12-09

## 2024-12-09 NOTE — PROGRESS NOTES
"Subjective   Chief complaint: Seth Antony is a 74 y.o. male who presents for Follow-up ( patient being seen for pelvic f/u.  Patient complains of having stomach issues abdominal pain after eating.  And has had a couple anxiety attacks. Pt fell oct 30th went to ED ).    HPI:  HPI Mr. Antony with past history of atrial fibrillation, CAD, hypertension, hyperlipidemia, CKD, diverticulitis, and colon cancer presents with \"on and off\" abdominal pain after meals for the past 3-4 months. The pain occurs approximately 45 minutes after eating and usually lasts for 45 minutes to one hour. The pain is intermittent, and does not occur with each meal. The patient cannot identify which foods the abdominal pain is associated with. The pain is centered in the middle of the lower abdomen at around the belt line. The patients stools vary in consistency and range from normal to soft and loose. There is no blood in the stools. The patient's last bowel movement was this morning. He occasionally complains of flatulence. He denies any nausea or vomiting.  He is also having increased anxiety attacks, and buspirone is not helping.   Objective   /84   Pulse 63   Resp 12   Wt 111 kg (244 lb)   SpO2 97%   BMI 34.03 kg/m²   Physical Exam  Constitutional:       Appearance: Normal appearance.   HENT:      Head: Normocephalic and atraumatic.   Cardiovascular:      Rate and Rhythm: Normal rate and regular rhythm.   Pulmonary:      Effort: Pulmonary effort is normal.      Breath sounds: Normal breath sounds.   Abdominal:      General: Abdomen is flat. Bowel sounds are normal.      Palpations: Abdomen is soft.   Neurological:      Mental Status: He is alert.         I have reviewed and reconciled the medication list with the patient today.   Current Outpatient Medications:     busPIRone (Buspar) 30 mg tablet, Take 1 tablet (30 mg) by mouth 2 times a day., Disp: 180 tablet, Rfl: 3    ergocalciferol (Vitamin D-2) 1.25 MG (66822 UT) capsule, " Take 1 capsule (1,250 mcg) by mouth once a week., Disp: , Rfl:     fenofibrate (Triglide) 160 mg tablet, Take 1 tablet (160 mg) by mouth once daily., Disp: 90 tablet, Rfl: 3    finasteride (Proscar) 5 mg tablet, Take 1 tablet (5 mg) by mouth once daily. Do not crush, chew, or split., Disp: 90 tablet, Rfl: 3    fish oil-dha-epa 1,200-144-216 mg capsule, Take by mouth., Disp: , Rfl:     furosemide (Lasix) 20 mg tablet, Take 1 tablet (20 mg) by mouth once daily., Disp: 90 tablet, Rfl: 3    gabapentin (Neurontin) 300 mg capsule, Take 1 capsule (300 mg) by mouth 3 times a day., Disp: 270 capsule, Rfl: 3    metoprolol succinate XL (Toprol-XL) 100 mg 24 hr tablet, Take 1 tablet (100 mg) by mouth once daily. Do not crush or chew., Disp: , Rfl:     metoprolol succinate XL (Toprol-XL) 200 mg 24 hr tablet, Take 1 tablet (200 mg) by mouth once daily., Disp: 90 tablet, Rfl: 3    multivitamin capsule, Take 1 capsule by mouth once daily., Disp: , Rfl:     omeprazole (PriLOSEC) 40 mg DR capsule, TAKE ONE CAPSULE BY MOUTH EVERY DAY, Disp: 90 capsule, Rfl: 0    rosuvastatin (Crestor) 20 mg tablet, Take 1 tablet (20 mg) by mouth once daily., Disp: 90 tablet, Rfl: 3    tadalafil 20 mg tablet, Take by mouth., Disp: , Rfl:     tamsulosin (Flomax) 0.4 mg 24 hr capsule, Take 1 capsule (0.4 mg) by mouth 2 times a day., Disp: 180 capsule, Rfl: 3     Imaging:  No results found.     Labs reviewed:    Lab Results   Component Value Date    WBC 4.4 12/05/2024    HGB 15.5 12/05/2024    HCT 48.4 12/05/2024     12/05/2024    CHOL 144 12/05/2024    TRIG 136 12/05/2024    HDL 39.6 12/05/2024    ALT 13 12/05/2024    AST 16 12/05/2024     12/05/2024    K 4.5 12/05/2024     12/05/2024    CREATININE 1.19 12/05/2024    BUN 12 12/05/2024    CO2 31 12/05/2024    TSH 1.72 07/25/2023    PSA 2.04 06/17/2020    INR 0.9 11/01/2022       Assessment/Plan   Problem List Items Addressed This Visit          Cardiac and Vasculature    Atrial  fibrillation (Multi) - Primary     Follow-up cardiology         Benign essential hypertension     138/84 today. Continue current medication         Mixed hyperlipidemia     Lipid panel WNL. Continue fish oil.             Gastrointestinal and Abdominal    Abdominal pain     Ddx includes IBS and ischemic bowel due to hypercoagulable risk factors. The patient wishes to wait and see if symptoms improve before deciding if he needs a CT abdomen/pelvis, MRA, and upper endoscopy. Will reassess next visit in May, 2025. The patient is due for his next colonoscopy in 2025.             Genitourinary and Reproductive    CKD (chronic kidney disease)     GFR 64 increased from 51, creatinine 1.19 decreased from 1.45 since lowering lasix therapy. The patient is compliant with leg elevation to reduce swelling. Continue current regimen.               Mental Health    Anxiety disorder     The patient reports elevated anxiety attacks for 3-4 months. He is having chest pains and sweats during the attacks. He is more stressed as his son is going through a difficult divorce. Will continue him on buspar 30 mg 2x daily but will add hydroxyzine 25 mg 2x PRN.           Other Visit Diagnoses       Hyperlipidemia, unspecified hyperlipidemia type                Continue current medications as listed  Follow up in 4 months

## 2024-12-09 NOTE — ASSESSMENT & PLAN NOTE
GFR 64 increased from 51, creatinine 1.19 decreased from 1.45 since lowering lasix therapy. The patient is compliant with leg elevation to reduce swelling. Continue current regimen.

## 2024-12-09 NOTE — ASSESSMENT & PLAN NOTE
Ddx includes IBS and ischemic bowel due to hypercoagulable risk factors. The patient wishes to wait and see if symptoms improve before deciding if he needs a CT abdomen/pelvis, MRA, and upper endoscopy. Will reassess next visit in May, 2025. The patient is due for his next colonoscopy in 2025.

## 2024-12-09 NOTE — ASSESSMENT & PLAN NOTE
The patient reports elevated anxiety attacks for 3-4 months. He is having chest pains and sweats during the attacks. He is more stressed as his son is going through a difficult divorce. Will continue him on buspar 30 mg 2x daily but will add hydroxyzine 25 mg 2x PRN.

## 2024-12-10 ENCOUNTER — TELEPHONE (OUTPATIENT)
Dept: PRIMARY CARE | Facility: CLINIC | Age: 74
End: 2024-12-10
Payer: MEDICARE

## 2024-12-10 DIAGNOSIS — R10.9 ABDOMINAL PAIN, UNSPECIFIED ABDOMINAL LOCATION: ICD-10-CM

## 2024-12-19 ENCOUNTER — HOSPITAL ENCOUNTER (OUTPATIENT)
Dept: RADIOLOGY | Facility: CLINIC | Age: 74
Discharge: HOME | End: 2024-12-19
Payer: MEDICARE

## 2024-12-19 DIAGNOSIS — R10.9 ABDOMINAL PAIN, UNSPECIFIED ABDOMINAL LOCATION: ICD-10-CM

## 2024-12-19 PROCEDURE — 74176 CT ABD & PELVIS W/O CONTRAST: CPT

## 2024-12-19 PROCEDURE — 2550000001 HC RX 255 CONTRASTS: Performed by: INTERNAL MEDICINE

## 2024-12-19 PROCEDURE — A9698 NON-RAD CONTRAST MATERIALNOC: HCPCS | Performed by: INTERNAL MEDICINE

## 2024-12-19 PROCEDURE — 74176 CT ABD & PELVIS W/O CONTRAST: CPT | Performed by: RADIOLOGY

## 2025-01-04 ENCOUNTER — ANCILLARY PROCEDURE (OUTPATIENT)
Dept: URGENT CARE | Age: 75
End: 2025-01-04
Payer: MEDICARE

## 2025-01-04 ENCOUNTER — OFFICE VISIT (OUTPATIENT)
Dept: URGENT CARE | Age: 75
End: 2025-01-04
Payer: MEDICARE

## 2025-01-04 VITALS
HEART RATE: 73 BPM | SYSTOLIC BLOOD PRESSURE: 103 MMHG | OXYGEN SATURATION: 95 % | TEMPERATURE: 97.2 F | DIASTOLIC BLOOD PRESSURE: 71 MMHG

## 2025-01-04 DIAGNOSIS — M79.644 FINGER PAIN, RIGHT: ICD-10-CM

## 2025-01-04 DIAGNOSIS — M20.011 MALLET FINGER OF RIGHT HAND: Primary | ICD-10-CM

## 2025-01-04 PROCEDURE — 73140 X-RAY EXAM OF FINGER(S): CPT | Mod: RIGHT SIDE

## 2025-01-04 NOTE — PROGRESS NOTES
"Subjective   History of Present Illness: Seth Antony is a 74 y.o. male. They present today with a chief complaint of Injury (Right mid finger pain. Pushing towel into recliner this am. Denies pain. States heard crack and now finger is \"crooked\").          Past Medical History  Allergies as of 01/04/2025 - Reviewed 01/04/2025   Allergen Reaction Noted    Nut - unspecified Other 02/13/2008       (Not in a hospital admission)       Past Medical History:   Diagnosis Date    Encounter for general adult medical examination without abnormal findings 03/17/2022    Encounter for annual health examination    Encounter for general adult medical examination without abnormal findings 03/02/2021    Encounter for annual health examination    Encounter for general adult medical examination without abnormal findings 04/22/2019    Encounter for annual health examination    Encounter for screening for malignant neoplasm of prostate 07/01/2021    Encounter for prostate cancer screening    Encounter for screening for malignant neoplasm of prostate 03/02/2021    Encounter for prostate cancer screening    Essential (primary) hypertension 07/13/2022    Benign essential hypertension    Hyperlipidemia, unspecified 11/15/2022    Hyperlipidemia    Personal history of other diseases of male genital organs     History of prostatitis    Personal history of other specified conditions     History of urinary frequency    Unspecified atrial fibrillation (Multi) 08/04/2022    Atrial fibrillation       Past Surgical History:   Procedure Laterality Date    COLONOSCOPY  04/05/2013    Complete Colonoscopy    OTHER SURGICAL HISTORY  08/04/2022    Right hemicolectomy    OTHER SURGICAL HISTORY  08/04/2022    Hernia repair    OTHER SURGICAL HISTORY  08/04/2022    Cataract surgery        reports that he has quit smoking. His smoking use included cigarettes. He has never used smokeless tobacco. He reports current alcohol use. He reports that he does not use " drugs.    Review of Systems  Review of Systems                               Objective    Vitals:    01/04/25 1345   BP: 103/71   Pulse: 73   Temp: 36.2 °C (97.2 °F)   TempSrc: Oral   SpO2: 95%     No LMP for male patient.    Physical Exam  Vitals reviewed.   HENT:      Head: Normocephalic and atraumatic.      Nose: Nose normal.      Mouth/Throat:      Mouth: Mucous membranes are moist.   Eyes:      Extraocular Movements: Extraocular movements intact.      Conjunctiva/sclera: Conjunctivae normal.      Pupils: Pupils are equal, round, and reactive to light.   Cardiovascular:      Rate and Rhythm: Normal rate and regular rhythm.   Pulmonary:      Effort: Pulmonary effort is normal.      Breath sounds: Normal breath sounds.   Musculoskeletal:      Comments: R. 3rd digit- flexion of DIP.    Skin:     General: Skin is warm.   Neurological:      Mental Status: He is alert and oriented to person, place, and time.   Psychiatric:         Mood and Affect: Mood normal.         Behavior: Behavior normal.             Point of Care Test & Imaging Results from this visit  No results found for this visit on 01/04/25.   XR fingers right 2+ views    Result Date: 1/4/2025  Interpreted By:  Shilpa Barnhart, STUDY: XR FINGERS RIGHT 2+ VIEWS; ;  1/4/2025 2:01 pm   INDICATION: Signs/Symptoms:jammed right mid finger.   ,M79.644 Pain in right finger(s)   COMPARISON: None.   ACCESSION NUMBER(S): [id number]   ORDERING CLINICIAN: BRITTANY MATTHEWS   FINDINGS: No evidence for acute fracture or dislocation. Soft tissue swelling involving the tip of the middle finger. No radiopaque foreign body.       No evidence for displaced acute fracture or dislocation. Soft tissue swelling of the middle finger distally.     MACRO: None   Signed by: Shilpa Barnhart 1/4/2025 2:25 PM Dictation workstation:   [id number]     Diagnostic study results (if any) were reviewed by Brittany Matthews PA-C.    Assessment/Plan   Allergies, medications, history, and pertinent  labs/EKGs/Imaging reviewed by Brittany Mccartney PA-C.     Medical Decision Making  - R. 3ed digit xray is negative.   - PE is suspicious for Mallet finger. I applied a finger splint during the visit and I instructed pt to keep it on for at least 4 weeks.  - ortho referral.   -         Patient is educated about their diagnoses.     -          Discussed medications benefits and adverse effects.     -          Answered all patient’s questions.     -          Patient will call 911 or go to the nearest ED if worsen symptoms .     -          Patient is agreeable to the plan of care and is deemed stable upon discharge.     -          Follow up with your primary care provider in two days.      Orders and Diagnoses  Diagnoses and all orders for this visit:  Mallet finger of right hand  -     Finger splint, static  -     Referral to Orthopaedic Surgery; Future  Finger pain, right  -     XR fingers right 2+ views; Future      Medical Admin Record      Patient disposition: Home    Electronically signed by Brittany Mccartney PA-C  2:42 PM

## 2025-02-03 ENCOUNTER — APPOINTMENT (OUTPATIENT)
Dept: ORTHOPEDIC SURGERY | Facility: CLINIC | Age: 75
End: 2025-02-03
Payer: MEDICARE

## 2025-02-03 DIAGNOSIS — M20.011 MALLET FINGER OF RIGHT HAND: ICD-10-CM

## 2025-02-03 PROCEDURE — 1159F MED LIST DOCD IN RCRD: CPT | Performed by: ORTHOPAEDIC SURGERY

## 2025-02-03 PROCEDURE — 99214 OFFICE O/P EST MOD 30 MIN: CPT | Performed by: ORTHOPAEDIC SURGERY

## 2025-02-03 PROCEDURE — 1036F TOBACCO NON-USER: CPT | Performed by: ORTHOPAEDIC SURGERY

## 2025-02-03 NOTE — PROGRESS NOTES
Chief Complaint   Chief Complaint   Patient presents with    Right Hand - Pain         HPI:      Seth Antony is a pleasant 74 y.o. year-old male who is seen today for right long finger injury told he had a mallet finger placed in a little splint always been removing it frequently all other body systems have been reviewed and are negative for complaint.    Review of Systems    There were no vitals filed for this visit.    Past Medical History:   Diagnosis Date    Encounter for general adult medical examination without abnormal findings 03/17/2022    Encounter for annual health examination    Encounter for general adult medical examination without abnormal findings 03/02/2021    Encounter for annual health examination    Encounter for general adult medical examination without abnormal findings 04/22/2019    Encounter for annual health examination    Encounter for screening for malignant neoplasm of prostate 07/01/2021    Encounter for prostate cancer screening    Encounter for screening for malignant neoplasm of prostate 03/02/2021    Encounter for prostate cancer screening    Essential (primary) hypertension 07/13/2022    Benign essential hypertension    Hyperlipidemia, unspecified 11/15/2022    Hyperlipidemia    Personal history of other diseases of male genital organs     History of prostatitis    Personal history of other specified conditions     History of urinary frequency    Unspecified atrial fibrillation (Multi) 08/04/2022    Atrial fibrillation     Patient Active Problem List   Diagnosis    Anxiety disorder    Atrial fibrillation (Multi)    Benign essential hypertension    BPH (benign prostatic hyperplasia)    Cellulitis    CKD (chronic kidney disease)    Coronary atherosclerosis of native coronary vessel    Sensorineural hearing loss, unilateral, left ear, with restricted hearing on the contralateral side    Diverticulitis, colon    Dizziness    Entropion    Erectile dysfunction    Hematuria    Mixed  hyperlipidemia    Inability to attain erection    Abdominal pain    Chest pain    Left hip pain    Low back pain    Muscle strain    Nocturia    Onychomycosis    Osteoarthrosis    Right nephrolithiasis    Seasonal allergies    Sensation of plugged ear on right side    Shoulder pain    Vitamin D deficiency    Arthralgia of both elbows    Bladder stone    Deviated nasal septum    Lesion of skin of nose    Obesity (BMI 35.0-39.9 without comorbidity)    ROSIE (obstructive sleep apnea)    Systolic ejection murmur    Venous insufficiency of both lower extremities    New onset of headaches    Acute maxillary sinusitis    COVID    Decreased hearing    Encounter for annual wellness visit (AWV) in Medicare patient       Medication Documentation Review Audit       Reviewed by Geoff Canela MA (Medical Assistant) on 02/03/25 at 1316      Medication Order Taking? Sig Documenting Provider Last Dose Status   busPIRone (Buspar) 30 mg tablet [id number] No Take 1 tablet (30 mg) by mouth 2 times a day. Tone Shin MD Taking Active   ergocalciferol (Vitamin D-2) 1.25 MG (80767 UT) capsule [id number] No Take 1 capsule (1,250 mcg) by mouth once a week. Historical MD Dwight Taking Active   fenofibrate (Triglide) 160 mg tablet [id number]  Take 1 tablet (160 mg) by mouth once daily. Tone Shin MD  Active   finasteride (Proscar) 5 mg tablet [id number] No Take 1 tablet (5 mg) by mouth once daily. Do not crush, chew, or split. Tone Shin MD Taking Active   fish oil-dha-epa 1,200-144-216 mg capsule [id number] No Take by mouth. Historical Provider, MD Taking Active   furosemide (Lasix) 20 mg tablet [id number]  Take 1 tablet (20 mg) by mouth once daily. Tone Shin MD  Active   gabapentin (Neurontin) 300 mg capsule [id number] No Take 1 capsule (300 mg) by mouth 3 times a day. Inocencio Baker MD Taking Active   hydrOXYzine pamoate (VistariL) 25 mg capsule [id number]  Take 1 capsule (25 mg) by mouth 3 times a day as needed for itching  for up to 10 days. Tone Shin MD   24 [id number]   metoprolol succinate XL (Toprol-XL) 100 mg 24 hr tablet [id number] No Take 1 tablet (100 mg) by mouth once daily. Do not crush or chew. Historical Provider, MD Taking Active   metoprolol succinate XL (Toprol-XL) 200 mg 24 hr tablet [id number]  Take 1 tablet (200 mg) by mouth once daily. Tone Shin MD  Active   multivitamin capsule [id number] No Take 1 capsule by mouth once daily. Historical Provider, MD Taking Active   omeprazole (PriLOSEC) 40 mg DR capsule [id number]  Take 1 capsule (40 mg) by mouth once daily. Tone Shin MD  Active   rosuvastatin (Crestor) 20 mg tablet [id number]  Take 1 tablet (20 mg) by mouth once daily. Tone Shin MD  Active   tadalafil 20 mg tablet [id number] No Take by mouth. Historical Provider, MD Taking Active   tamsulosin (Flomax) 0.4 mg 24 hr capsule [id number] No Take 1 capsule (0.4 mg) by mouth 2 times a day. Tone Shin MD Taking Active                    Allergies   Allergen Reactions    Nut - Unspecified Other     hx of diverticulitis - not eating nuts       Social History     Socioeconomic History    Marital status:      Spouse name: Not on file    Number of children: Not on file    Years of education: Not on file    Highest education level: Not on file   Occupational History    Not on file   Tobacco Use    Smoking status: Former     Types: Cigarettes    Smokeless tobacco: Never   Substance and Sexual Activity    Alcohol use: Yes     Comment: Seldom    Drug use: Never    Sexual activity: Not on file   Other Topics Concern    Not on file   Social History Narrative    Not on file     Social Drivers of Health     Financial Resource Strain: Not on file   Food Insecurity: Not on file   Transportation Needs: Not on file   Physical Activity: Not on file   Stress: Not on file   Social Connections: Not on file   Intimate Partner Violence: Not on file   Housing Stability: Not on file       Past Surgical History:  "  Procedure Laterality Date    COLONOSCOPY  04/05/2013    Complete Colonoscopy    OTHER SURGICAL HISTORY  08/04/2022    Right hemicolectomy    OTHER SURGICAL HISTORY  08/04/2022    Hernia repair    OTHER SURGICAL HISTORY  08/04/2022    Cataract surgery       There is no height or weight on file to calculate BMI.    HgA1c:   No results found for: \"HGBA1C\", \"AFTYFXWB3C\"    Physical Exam:  Constitutional: Well-developed well-nourished   Eyes: Sclerae anicteric, pupils equal and round  HENT: Normocephalic atraumatic  Cardiovascular: Pulses full, regular rate and rhythm  Respiratory: Breathing not labored, no wheezing  Integumentary: Skin intact, no lesions or rashes  Neurological: Sensation intact, no gross strength deficits, reflexes equal  Psychiatric: Alert oriented and appropriate  Hematologic/lymphatic: No lymphadenopathy  Right hand obvious mallet finger of the long finger          Imaging:  Negative        Impression/Plan:  Soft tissue mallet  Recommended stack splint for 6 weeks not to remove this understands he will likely have somewhat of a flexion deformity since he essentially had delayed treatment  "

## 2025-03-03 ENCOUNTER — APPOINTMENT (OUTPATIENT)
Dept: GASTROENTEROLOGY | Facility: CLINIC | Age: 75
End: 2025-03-03
Payer: MEDICARE

## 2025-03-03 VITALS — OXYGEN SATURATION: 98 % | HEIGHT: 71 IN | HEART RATE: 85 BPM | BODY MASS INDEX: 34.23 KG/M2 | WEIGHT: 244.5 LBS

## 2025-03-03 DIAGNOSIS — R14.0 BLOATING: Primary | ICD-10-CM

## 2025-03-03 DIAGNOSIS — Z85.038 HISTORY OF COLON CANCER: ICD-10-CM

## 2025-03-03 DIAGNOSIS — R14.1 ABDOMINAL GAS PAIN: ICD-10-CM

## 2025-03-03 PROCEDURE — 99204 OFFICE O/P NEW MOD 45 MIN: CPT | Performed by: INTERNAL MEDICINE

## 2025-03-03 PROCEDURE — 3008F BODY MASS INDEX DOCD: CPT | Performed by: INTERNAL MEDICINE

## 2025-03-03 PROCEDURE — 1159F MED LIST DOCD IN RCRD: CPT | Performed by: INTERNAL MEDICINE

## 2025-03-03 RX ORDER — APIXABAN 5 MG/1
5 TABLET, FILM COATED ORAL 2 TIMES DAILY
COMMUNITY

## 2025-03-03 ASSESSMENT — ENCOUNTER SYMPTOMS: SHORTNESS OF BREATH: 0

## 2025-03-03 NOTE — PATIENT INSTRUCTIONS
Check hydrogen breath test to evaluate for small intestinal bacterial overgrowth. Fine to use Simethicone (Gas-X) as needed. Due for surveillance colonoscopy in 9/2025.

## 2025-03-03 NOTE — PROGRESS NOTES
REASON FOR VISIT:  Abdominal pain  PCP (requesting provider): Tone Shin MD.    HPI:  Seth Antony is a 75 y.o. male with a past medical history of Afib on Eliquis, HTN, HLD, CKD, anxiety, and cecal colon cancer s/p right hemicolectomy (2002) being evaluated for abdominal gas and bloating. CT A/P w/ contrast showed cholelithiasis (12/2024). Celiac panel negative (3/2022).     The patient notes abdominal gas issues since COVID. He was following with [facility] for EGD and colonoscopy. He has occasional lower abdominal discomfort still. He has migratory abdominal pain. Bowel habits are regular once or twice daily. Stool is on softer side. No blood in the stool. Pain is intermittent and can range from sharp to cramping and bloating. He will take Gas-X with improvement. He takes Lactaid when eating dairy. Patient reports he is due for colonoscopy this year. He does not recall testing for bacterial overgrowth. Family history of ulcers. He reports he is on Eliquis for Afib and this was just recently started. His Cardiologist is at [facility].    PSurgHx:  -Right hemicolectomy     FamHx: No GI cancer     Prior Endoscopy:  -Colonoscopy (9/2022): Good prep, patent ileocolonic anastomosis, three 2-3 mm descending and rectal polyps (TA, HP x 2), diverticulosis, otherwise normal colon.  -EGD (4/2021): Normal esophagus, small hiatal hernia, mild gastritis (H. Pylori -), single gastric polyp (normal gastric mucosa), duodenal erosions.   -Colonoscopy (6/2020): Good prep, two medium sized polyps in transverse and sigmoid (TA, HP), left-sided diverticulosis, otherwise normal colon.  -Colonoscopy (7/2015): Patent ileocolonic anastomosis, 8 mm sigmoid polyp (HP), IH, otherwise normal colon.  -EGD (6/2012): Normal esophagus, gastritis (H. Pylori -), duodenitis (biopsies showed peptic duodenitis).  -Colonoscopy (6/2012): Ileocolonic anastomosis, 8 mm rectosigmoid polyp (HP), rectosigmoid diverticulosis, otherwise normal colon.  -Colonoscopy  (6/2010): No procedure report available (path showed normal sigmoid biopsies).  -Colonoscopy (10/2007): No procedure report available (path showed ascending SSA).   -Colonoscopy (7/2007): No procedure report available (path showed rectal HP).  -Colonoscopy (5/2002): No procedure report available (path showed cecal TA and sigmoid biopsies with mold chronic colitis).    PAST MEDICAL HISTORY  Past Medical History:   Diagnosis Date    Encounter for general adult medical examination without abnormal findings 03/17/2022    Encounter for annual health examination    Encounter for general adult medical examination without abnormal findings 03/02/2021    Encounter for annual health examination    Encounter for general adult medical examination without abnormal findings 04/22/2019    Encounter for annual health examination    Encounter for screening for malignant neoplasm of prostate 07/01/2021    Encounter for prostate cancer screening    Encounter for screening for malignant neoplasm of prostate 03/02/2021    Encounter for prostate cancer screening    Essential (primary) hypertension 07/13/2022    Benign essential hypertension    Hyperlipidemia, unspecified 11/15/2022    Hyperlipidemia    Personal history of other diseases of male genital organs     History of prostatitis    Personal history of other specified conditions     History of urinary frequency    Unspecified atrial fibrillation (Multi) 08/04/2022    Atrial fibrillation       PAST SURGICAL HISTORY  Past Surgical History:   Procedure Laterality Date    COLONOSCOPY  04/05/2013    Complete Colonoscopy    OTHER SURGICAL HISTORY  08/04/2022    Right hemicolectomy    OTHER SURGICAL HISTORY  08/04/2022    Hernia repair    OTHER SURGICAL HISTORY  08/04/2022    Cataract surgery       FAMILY HISTORY  Family History   Problem Relation Name Age of Onset    Cancer Mother      Other (cardiac issues) Father      Heart attack Sister         SOCIAL HISTORY   reports that he has  quit smoking. His smoking use included cigarettes. He has never used smokeless tobacco. He reports current alcohol use. He reports that he does not use drugs.    REVIEW OF SYSTEMS  Review of Systems   Respiratory:  Negative for shortness of breath.    Cardiovascular:  Negative for chest pain.   All other systems reviewed and are negative.    A 10+ point review of systems was otherwise negative except as noted and per HPI.    ALLERGIES  Allergies   Allergen Reactions    Nut - Unspecified Other     hx of diverticulitis - not eating nuts       MEDICATIONS  Current Outpatient Medications   Medication Instructions    busPIRone (BUSPAR) 30 mg, oral, 2 times daily    Eliquis 5 mg, 2 times daily    ergocalciferol (Vitamin D-2) 1.25 MG (68236 UT) capsule 1 capsule, Weekly    fenofibrate (TRIGLIDE) 160 mg, oral, Daily RT    finasteride (PROSCAR) 5 mg, oral, Daily, Do not crush, chew, or split.    fish oil-dha-epa 1,200-144-216 mg capsule Take by mouth.    furosemide (LASIX) 20 mg, oral, Daily RT    gabapentin (NEURONTIN) 300 mg, oral, 3 times daily    hydrOXYzine pamoate (VISTARIL) 25 mg, oral, 3 times daily PRN    metoprolol succinate XL (TOPROL-XL) 100 mg, Daily    metoprolol succinate XL (TOPROL-XL) 200 mg, oral, Daily    multivitamin capsule 1 capsule, Daily RT    omeprazole (PRILOSEC) 40 mg, oral, Daily    rosuvastatin (CRESTOR) 20 mg, oral, Daily    tadalafil 20 mg tablet Take by mouth.    tamsulosin (FLOMAX) 0.4 mg, oral, 2 times daily       VITALS  Vitals:    03/03/25 1414   Pulse: 85   SpO2: 98%      Body mass index is 34.1 kg/m².    PHYSICAL EXAM  CONSTITUTIONAL: NAD, appears stated age  EYES: anicteric sclera, sclera clear  HEAD: normocephalic, atraumatic   NECK: supple   PULMONARY: CTAB  CARDIOVASCULAR: RRR, no M/R/G appreciated   ABDOMEN: soft, NTND, +BS, no rebound or guarding   MUSCULOSKELETAL: no edema  SKIN: no jaundice   PSYCHIATRIC: AOx3, appropriate insight and judgement    LABS  WBC (x10*3/uL)   Date  Value   12/05/2024 4.4   07/30/2024 4.1 (L)   04/02/2024 3.3 (L)     Hemoglobin (g/dL)   Date Value   12/05/2024 15.5   07/30/2024 15.1   04/02/2024 14.7     Platelets (x10*3/uL)   Date Value   12/05/2024 207   07/30/2024 259   04/02/2024 207     Sodium (mmol/L)   Date Value   12/05/2024 142   07/30/2024 139   04/02/2024 141     Potassium (mmol/L)   Date Value   12/05/2024 4.5   07/30/2024 4.4   04/02/2024 4.4     Chloride (mmol/L)   Date Value   12/05/2024 103   07/30/2024 100   04/02/2024 105     Bicarbonate (mmol/L)   Date Value   12/05/2024 31   07/30/2024 31   04/02/2024 30     Urea Nitrogen (mg/dL)   Date Value   12/05/2024 12   07/30/2024 18   04/02/2024 11     Creatinine (mg/dL)   Date Value   12/05/2024 1.19   07/30/2024 1.45 (H)   04/02/2024 1.09     Calcium (mg/dL)   Date Value   12/05/2024 9.7   07/30/2024 10.1   04/02/2024 9.7     Total Protein (g/dL)   Date Value   12/05/2024 6.7   07/30/2024 6.5   04/02/2024 6.3 (L)     Bilirubin, Total (mg/dL)   Date Value   12/05/2024 0.7   07/30/2024 0.8   04/02/2024 0.6     Alkaline Phosphatase (U/L)   Date Value   12/05/2024 48   07/30/2024 39   04/02/2024 43     ALT (U/L)   Date Value   12/05/2024 13   07/30/2024 13   04/02/2024 16     AST (U/L)   Date Value   12/05/2024 16   07/30/2024 19   04/02/2024 19     Glucose (mg/dL)   Date Value   12/05/2024 84   07/30/2024 96   04/02/2024 101 (H)     Lipase (U/L)   Date Value   06/24/2018 26       ASSESSMENT/PLAN  Seth Antony is a 75 y.o. male with a past medical history of Afib on Eliquis, HTN, HLD, CKD, anxiety, and cecal colon cancer s/p right hemicolectomy (2002) being evaluated for abdominal gas and bloating. CT A/P w/ contrast showed cholelithiasis (12/2024). Celiac panel negative (3/2022). Patient with migratory abdominal pain as well as bloating. Prior extensive endoscopic evaluation history given history of colon cancer. Recent CT scan with cholelithiasis but symptoms are not typical of pain related to this  finding. Differential includes SIBO and functional abdominal pain. He will be due for surveillance colonoscopy later this year.     -Check hydrogen breath test for SIBO   -Recommend use OTC Gas-X PRN  -Due for surveillance colonoscopy in 9/2025    Follow-up in the office PRN.    Signature: Seth Fraser MD

## 2025-04-03 DIAGNOSIS — G44.229 CHRONIC TENSION-TYPE HEADACHE, NOT INTRACTABLE: ICD-10-CM

## 2025-04-04 RX ORDER — GABAPENTIN 300 MG/1
300 CAPSULE ORAL 3 TIMES DAILY
Qty: 270 CAPSULE | Refills: 0 | Status: SHIPPED | OUTPATIENT
Start: 2025-04-04

## 2025-04-21 ENCOUNTER — APPOINTMENT (OUTPATIENT)
Dept: NEUROLOGY | Facility: CLINIC | Age: 75
End: 2025-04-21
Payer: MEDICARE

## 2025-06-03 ENCOUNTER — APPOINTMENT (OUTPATIENT)
Dept: GASTROENTEROLOGY | Facility: CLINIC | Age: 75
End: 2025-06-03
Payer: MEDICARE

## 2025-06-30 DIAGNOSIS — G44.229 CHRONIC TENSION-TYPE HEADACHE, NOT INTRACTABLE: ICD-10-CM

## 2025-07-01 RX ORDER — GABAPENTIN 300 MG/1
300 CAPSULE ORAL 3 TIMES DAILY
Qty: 270 CAPSULE | Refills: 0 | Status: SHIPPED | OUTPATIENT
Start: 2025-07-01

## 2025-08-11 ENCOUNTER — APPOINTMENT (OUTPATIENT)
Dept: NEUROLOGY | Facility: CLINIC | Age: 75
End: 2025-08-11
Payer: MEDICARE

## 2025-08-11 VITALS
SYSTOLIC BLOOD PRESSURE: 124 MMHG | HEART RATE: 67 BPM | TEMPERATURE: 96.8 F | DIASTOLIC BLOOD PRESSURE: 76 MMHG | BODY MASS INDEX: 33.88 KG/M2 | HEIGHT: 71 IN | WEIGHT: 242 LBS

## 2025-08-11 DIAGNOSIS — G44.229 CHRONIC TENSION-TYPE HEADACHE, NOT INTRACTABLE: ICD-10-CM

## 2025-08-11 PROCEDURE — 3078F DIAST BP <80 MM HG: CPT | Performed by: PSYCHIATRY & NEUROLOGY

## 2025-08-11 PROCEDURE — 3008F BODY MASS INDEX DOCD: CPT | Performed by: PSYCHIATRY & NEUROLOGY

## 2025-08-11 PROCEDURE — 99213 OFFICE O/P EST LOW 20 MIN: CPT | Performed by: PSYCHIATRY & NEUROLOGY

## 2025-08-11 PROCEDURE — 3074F SYST BP LT 130 MM HG: CPT | Performed by: PSYCHIATRY & NEUROLOGY

## 2025-08-11 PROCEDURE — 1160F RVW MEDS BY RX/DR IN RCRD: CPT | Performed by: PSYCHIATRY & NEUROLOGY

## 2025-08-11 PROCEDURE — 1159F MED LIST DOCD IN RCRD: CPT | Performed by: PSYCHIATRY & NEUROLOGY

## 2025-08-11 RX ORDER — GABAPENTIN 300 MG/1
300 CAPSULE ORAL 3 TIMES DAILY
Qty: 270 CAPSULE | Refills: 3 | Status: SHIPPED | OUTPATIENT
Start: 2025-08-11

## 2025-09-29 ENCOUNTER — APPOINTMENT (OUTPATIENT)
Dept: ORTHOPEDIC SURGERY | Facility: CLINIC | Age: 75
End: 2025-09-29
Payer: MEDICARE

## 2026-08-17 ENCOUNTER — APPOINTMENT (OUTPATIENT)
Dept: NEUROLOGY | Facility: CLINIC | Age: 76
End: 2026-08-17
Payer: MEDICARE